# Patient Record
Sex: FEMALE | Race: WHITE | Employment: UNEMPLOYED | URBAN - METROPOLITAN AREA
[De-identification: names, ages, dates, MRNs, and addresses within clinical notes are randomized per-mention and may not be internally consistent; named-entity substitution may affect disease eponyms.]

---

## 2017-03-03 ENCOUNTER — TELEPHONE (OUTPATIENT)
Dept: FAMILY MEDICINE | Facility: CLINIC | Age: 32
End: 2017-03-03

## 2017-03-03 NOTE — TELEPHONE ENCOUNTER
Panel Management Review      Patient has the following on her problem list: None      Composite cancer screening  Chart review shows that this patient is due/due soon for the following Pap Smear  Summary:    Patient is due/failing the following:   PAP    Action needed:   Patient needs office visit for PE/PAP.    Type of outreach:    updated chart with recent pap, had normal  pap 05/11/2016 at [facility]    Questions for provider review:    None                                                                                                                                    Elif Bonilla/JOSÉ  [place]       Chart routed to none .

## 2017-07-08 ENCOUNTER — HOSPITAL ENCOUNTER (EMERGENCY)
Facility: CLINIC | Age: 32
Discharge: HOME | End: 2017-07-08
Attending: EMERGENCY MEDICINE | Admitting: EMERGENCY MEDICINE
Payer: COMMERCIAL

## 2017-07-08 ENCOUNTER — APPOINTMENT (OUTPATIENT)
Dept: ULTRASOUND IMAGING | Facility: CLINIC | Age: 32
End: 2017-07-08
Attending: PHYSICIAN ASSISTANT
Payer: COMMERCIAL

## 2017-07-08 ENCOUNTER — APPOINTMENT (OUTPATIENT)
Dept: CT IMAGING | Facility: CLINIC | Age: 32
End: 2017-07-08
Attending: PHYSICIAN ASSISTANT
Payer: COMMERCIAL

## 2017-07-08 ENCOUNTER — TRANSFERRED RECORDS (OUTPATIENT)
Dept: HEALTH INFORMATION MANAGEMENT | Facility: CLINIC | Age: 32
End: 2017-07-08

## 2017-07-08 VITALS
OXYGEN SATURATION: 100 % | SYSTOLIC BLOOD PRESSURE: 110 MMHG | RESPIRATION RATE: 18 BRPM | TEMPERATURE: 98.2 F | DIASTOLIC BLOOD PRESSURE: 60 MMHG | HEART RATE: 67 BPM

## 2017-07-08 DIAGNOSIS — R79.89 ELEVATED D-DIMER: ICD-10-CM

## 2017-07-08 DIAGNOSIS — M79.652 PAIN OF LEFT THIGH: ICD-10-CM

## 2017-07-08 LAB
INTERPRETATION ECG - MUSE: NORMAL
TROPONIN I SERPL-MCNC: NORMAL UG/L (ref 0–0.04)

## 2017-07-08 PROCEDURE — 71260 CT THORAX DX C+: CPT

## 2017-07-08 PROCEDURE — 25000128 H RX IP 250 OP 636: Performed by: EMERGENCY MEDICINE

## 2017-07-08 PROCEDURE — 84484 ASSAY OF TROPONIN QUANT: CPT | Performed by: PHYSICIAN ASSISTANT

## 2017-07-08 PROCEDURE — 93971 EXTREMITY STUDY: CPT | Mod: LT

## 2017-07-08 PROCEDURE — 99285 EMERGENCY DEPT VISIT HI MDM: CPT | Mod: 25

## 2017-07-08 RX ORDER — IOPAMIDOL 755 MG/ML
500 INJECTION, SOLUTION INTRAVASCULAR ONCE
Status: COMPLETED | OUTPATIENT
Start: 2017-07-08 | End: 2017-07-08

## 2017-07-08 RX ORDER — HYDROCODONE BITARTRATE AND ACETAMINOPHEN 5; 325 MG/1; MG/1
1-2 TABLET ORAL EVERY 4 HOURS PRN
Qty: 10 TABLET | Refills: 0 | Status: SHIPPED | OUTPATIENT
Start: 2017-07-08 | End: 2021-01-08

## 2017-07-08 RX ORDER — METHOCARBAMOL 750 MG/1
750 TABLET, FILM COATED ORAL 3 TIMES DAILY PRN
Qty: 30 TABLET | Refills: 0 | Status: SHIPPED | OUTPATIENT
Start: 2017-07-08 | End: 2017-07-13

## 2017-07-08 RX ADMIN — IOPAMIDOL 69 ML: 755 INJECTION, SOLUTION INTRAVENOUS at 18:03

## 2017-07-08 RX ADMIN — SODIUM CHLORIDE 83 ML: 9 INJECTION, SOLUTION INTRAVENOUS at 18:03

## 2017-07-08 ASSESSMENT — ENCOUNTER SYMPTOMS
FEVER: 0
SHORTNESS OF BREATH: 0
DYSURIA: 0
ABDOMINAL PAIN: 0
COUGH: 0

## 2017-07-08 NOTE — ED PROVIDER NOTES
History     Chief Complaint:  Leg swelling    HPI     History of present illness was obtained through an  service due to language barrier.  Brittany Clancy is a 32 year old female who presents with left leg swelling.  She first noticed one week ago around her left calf.  Later that day, she noticed pain in the left calf and it has slowly traveled up her left leg and now she is having pain in her left groin area.  She denies any injury to the area. She denies having any history of this pain.  She was at urgent care earlier today with a complaint of chest pressure.  They obtained labs (displayed below).  They're concerned given her elevated d-dimer and complaint of chest pain that this could represent a pulmonary embolism so she was sent here for further evaluation.  Here, she denies any current chest pain or shortness of breath. She denies fevers, cough, abdominal pain, or dysuria.     Lab work [facility](7/8/2017)  WBC:9.2, Hgb: 11.5, Creatinine: 0.6, K:3.9, D-dimer:0.86    CARDIAC RISK FACTORS:  Sex:    female  Tobacco:   negative  Hypertension:   negative  Hyperlipidemia:  negative  Diabetes:   negative  Family History:  negative    PE/DVT RISK FACTORS:  Sex:    female  Hormones:   negative  Tobacco:   negative  Cancer:   negative  Travel:   negative  Surgery:   negative  Other immobilization: negative  Personal history:  negative  Family history:  negative    Allergies:  NKDA     Medications:    The patient is currently on no regular medications.    Past Medical History:    Depression  Anxiety  Gastritis  Obesity    Past Surgical History:    C section    Family History:    Father: MI, Diabetes  Mother: Lipids, Gastric ulcer    Social History:  Negative for tobacco use.  Negative for alcohol use.  Marital Status:   [2]     Review of Systems   Constitutional: Negative for fever.   Respiratory: Negative for cough and shortness of breath.    Cardiovascular: Positive for  chest pain and leg swelling.   Gastrointestinal: Negative for abdominal pain.   Genitourinary: Negative for dysuria.   Skin: Negative for rash.   All other systems reviewed and are negative.    Physical Exam     Patient Vitals for the past 24 hrs:   BP Temp Temp src Pulse Resp SpO2   07/08/17 1745 99/64 - - - - -   07/08/17 1730 94/60 - - - - 96 %   07/08/17 1700 111/75 - - - - -   07/08/17 1645 113/56 - - - - 97 %   07/08/17 1632 116/75 98.2  F (36.8  C) Oral 67 18 100 %       Physical Exam  General: Resting comfortably.  Alert and oriented.   Head:  The scalp, face, and head appear normal   Eyes:  The pupils are equal, round, and reactive to light     Extraocular muscles are intact    Conjunctivae and sclerae are normal    CV:  Regular rate and rhythm     Normal S1/S2    No pathological murmur detected   Resp:  Lungs are clear to auscultation    Non-labored    No rales or wheezing   GI:  Abdomen is soft, non-distended    No rebound tenderness     Normal bowel sounds   MS:  Normal muscular tone. Mild swelling to left calf with no tenderness.Tenderness to palpation of groin. Pain with hip flexion.  Skin:  No rash or acute skin lesions noted   Neuro: Speech is normal and fluent.     Emergency Department Course   ECG:       EKG Interpretation:      Interpreted by Sherie Busby  Time reviewed:   Symptoms at time of EKG:  none  Rhythm: normal sinus   Rate: normal  Axis: NORMAL  Ectopy: none  Conduction: normal  ST Segments/ T Waves: No ST-T wave changes  Q Waves: none  Comparison to prior: No old EKG available    Clinical Impression: normal EKG    Imaging:  CT Chest Pulmonary Embolism w Contrast   Final Result   IMPRESSION: No acute pulmonary disease or pulmonary embolus. No   evidence for aortic dissection.      MUNIRA WELDON MD       Lower Extremity Venous Duplex Left   Final Result   IMPRESSION:    Negative for deep vein thrombus left leg.      MUNIRA WELDON MD        Laboratory:  Results for orders placed  or performed during the hospital encounter of 07/08/17   US Lower Extremity Venous Duplex Left    Narrative    LEFT LEG VENOUS ULTRASOUND 7/8/2017 5:27 PM    HISTORY: leg swelling and pain     TECHNIQUE; Venous Doppler ultrasound with color flow and spectral  Doppler with waveform analysis performed. Compression and augmentation  performed.    COMPARISON none     FINDINGS: There is no evidence for deep vein thrombus in the left  common femoral, superficial femoral, popliteal, or visualized portions  of posterior tibial veins.      Impression    IMPRESSION:   Negative for deep vein thrombus left leg.    MUNIRA WELDON MD   CT Chest Pulmonary Embolism w Contrast    Narrative    CT CHEST PULMONARY EMBOLISM WITH CONTRAST7/8/2017 6:39 PM    HISTORY: chest pain    TECHNIQUE: Axial images from thoracic inlet to diaphragm. 69mL  Isovue-370 IV contrast  Radiation dose for this scan was reduced using  automated exposure control, adjustment of the mA and/or kV according  to patient size, or iterative reconstruction technique.    COMPARISON: None.    FINDINGS:   CHEST: No acute pulmonary embolus. No thoracic aortic dissection or  adenopathy. Tiny calcified right upper lobe granuloma image 86 series  5. No acute infiltrate or pleural effusion, lungs otherwise clear. No  aggressive bone lesions.      Impression    IMPRESSION: No acute pulmonary disease or pulmonary embolus. No  evidence for aortic dissection.    MUNIRA WELDON MD   Troponin I   Result Value Ref Range    Troponin I ES  0.000 - 0.045 ug/L     <0.015  The 99th percentile for upper reference range is 0.045 ug/L.  Troponin values in   the range of 0.045 - 0.120 ug/L may be associated with risks of adverse   clinical events.       Interventions:  Medications   0.9% sodium chloride BOLUS (0 mLs Intravenous Stopped 7/8/17 1804)   iopamidol (ISOVUE-370) solution 500 mL (69 mLs Intravenous Given 7/8/17 1803)     Emergency Department Course:  Nursing notes and vitals  reviewed. I performed an exam of the patient as documented above. Dr. Parker also saw this patient.     The above workup was undertaken.    Impression & Plan    Medical Decision Making:  Brittany Clancy is a 32 year old female who presents with left leg swelling. She was in urgent care earlier today and had an elevated D-dimer and was sent here for further examination. The patient presented vitally stable and afebrile. The patient arrived with no chest pain or shortness of breath, so CT scan was not obtained immediately, rather an ultrasound of her left leg was taken. US did not demonstrate a clot. Given her elevated D-dimer at urgent care a CT scan was obtained and did not reveal PE or other abnormality. EKG and troponin were obtained given her chest pressure episodes earlier and her one episode here and were normal. I do not think there is a serious cause of her symptoms at this time given the negative workup here and intermittent nature. HEART Score is 0. I think her leg symptoms are likely due to a hip flexor or groin muscle strain. She will be discharged home with a prescription for robaxin and norco. A physical therapy referral was also made. She was instructed to take scheduled ibuprofen for the next several days and Norco for breakthrough pain.  She was warned on the sedating effects of Robaxin.  She was asked to follow up in clinic in 2-3 days for recheck.  She was asked to return to the ED if she develops new or worsening chest pain, shortness of breath, fever, or any other concerning symptoms.  All questions were answered prior to discharge. The patient understands and agrees plan.     Diagnosis:    ICD-10-CM    1. Pain of left thigh M79.652 PHYSICAL THERAPY REFERRAL   2. Elevated d-dimer R79.89      Disposition:  discharged to home    Discharge Medications:  Discharge Medication List as of 7/8/2017  7:26 PM      START taking these medications    Details   methocarbamol (ROBAXIN) 750 MG tablet Take  1 tablet (750 mg) by mouth 3 times daily as needed for muscle spasms, Disp-30 tablet, R-0, Local Print      HYDROcodone-acetaminophen (NORCO) 5-325 MG per tablet Take 1-2 tablets by mouth every 4 hours as needed for pain, Disp-10 tablet, R-0, Local Print              Sherie Busby PA-C  07/08/17 7091

## 2017-07-08 NOTE — ED AVS SNAPSHOT
[facility] Emergency Department    [address]    [facility] [address]    Phone:  [phone]    Fax:  [phone]                                       Brittany Clancy   MRN: [id number]    Department:  [facility] Emergency Department   Date of Visit:  7/8/2017           After Visit Summary Signature Page     I have received my discharge instructions, and my questions have been answered. I have discussed any challenges I see with this plan with the nurse or doctor.    ..........................................................................................................................................  Patient/Patient Representative Signature      ..........................................................................................................................................  Patient Representative Print Name and Relationship to Patient    ..................................................               ................................................  Date                                            Time    ..........................................................................................................................................  Reviewed by Signature/Title    ...................................................              ..............................................  Date                                                            Time

## 2017-07-08 NOTE — ED NOTES
Patient returned from CT in tears due to chest pain. Patient reports it came on suddenly, she couldn't catch her breath and it was as if someone punched her in the chest. It only lasted a few minutes and is gone at this time. EKG obtained and patient is in NSR.

## 2017-07-08 NOTE — ED AVS SNAPSHOT
[facility] Emergency Department    [address]    [facility] [address]    Phone:  [phone]    Fax:  [phone]                                       Brittany Clancy   MRN: [id number]    Department:  [facility] Emergency Department   Date of Visit:  7/8/2017           Patient Information     Date Of Birth          1985        Your diagnoses for this visit were:     Pain of left thigh     Elevated d-dimer        You were seen by Chitra Parker MD.      Follow-up Information     Follow up with No Ref-Primary, Physician In 3 days.    Why:  recheck in clinic        Follow up with [facility] Emergency [facility].    Specialty:  EMERGENCY MEDICINE    Why:  If symptoms worsen    Contact information:    [address]  [facility] [id number] [phone]        Discharge Instructions         You were evaluated for leg swelling. We did not find a clot in your legs or lungs. I think your symptoms may be due to a pulled muscle in your thigh. Please take ibuprofen 600mg every 6-8 hours. Take Norco for breakthrough pain. Take robaxin as needed for muscle spasm. Please follow up with your [facility] in a few days for a recheck. A referral to physical therapy was made for you.  Please return to the ED if you develop new/worsening chest pain, shortness of breath, fever or other concerning symptoms.   Distensión Muscular,Extremidad [Muscle Strain, Extremity]  JACQUELIN DISTENSIÓN MUSCULAR consiste en un estiramiento y desgarro de las fibras de un músculo. [place] produce dolor, especialmente al  el músculo. También puede breann algo de hinchazón y moretones.  Cuidados En [place]:  1) Mantenga elevada la cheikh lesionada para reducir el dolor y la hinchazón. [place] es especialmente importante lele las primeras 48 horas.  2) Aplíquese un paquete de hielo (hielo molido o en cubitos, en jacquelin bolsa de plástico envuelta en jacquelin toalla) lele  20 minutos cada 2 a 4 horas el primer día. Debería seguir con paquetes de hielo 3 a 4 veces al día el luiz y tercer día. A menos que le instruyan otra cosa, el cuarto día, puede empezar a remojar o aplicar compresas (toallas mojadas en [place]) sobre la cheikh afectada 3-4 veces al día mientras ejercita suavemente dicha cheikh.  3) Puede usar acetaminofén (Tylenol) o ibuprofeno (Motrin o Advil) para controlar el dolor, a menos que le hayan recetado otro medicamento. [ NOTA : Si tiene jacquelin enfermedad hepática o renal crónica, o ha tenido alguna vez jacquelin úlcera estomacal o sangrado gastrointestinal, consulte con fischer médico antes de ashlie estos medicamentos.]  4) Para las DISTENSIONES EN JACQUELIN PIERNA: Si le recomendaron el uso de MULETAS, no apoye todo fischer peso en la pierna lesionada hasta que pueda hacerlo sin sentir dolor. Podrá volver a hacer deportes cuando sea capaz de saltar y correr con la pierna lesionada sin sentir dolor.  Programe jacquelin VISITA DE CONTROL con fischer médico o con sherrie centro si no empieza a mejorar en los próximos anup días.  Busque Prontamente Atención Médica  si algo de lo siguiente ocurre:  -- Hinchazón, frío, coloración azulada, entumecimiento u hormigueo en los dedos de las papa o de los pies  -- Aumento del dolor o aumento de la hinchazón  Date Last Reviewed: 11/19/2015 2000-2017 [employer]. [address] [place] [address], [place] [address]. Todos los derechos reservados. Esta información no pretende sustituir la atención médica profesional. Sólo fischer médico puede diagnosticar y tratar un problema de annia.          24 Hour Appointment Hotline       To make an appointment at any [facility] clinic, call [phone] ([phone]). If you don't have a family doctor or clinic, we will help you find one. [facility] clinics are conveniently located to serve the needs of you and your family.          ED Discharge Orders     PHYSICAL THERAPY REFERRAL                    Review of your  medicines      START taking        Dose / Directions Last dose taken    HYDROcodone-acetaminophen 5-325 MG per tablet   Commonly known as:  NORCO   Dose:  1-2 tablet   Quantity:  10 tablet        Take 1-2 tablets by mouth every 4 hours as needed for pain   Refills:  0        methocarbamol 750 MG tablet   Commonly known as:  ROBAXIN   Dose:  750 mg   Quantity:  30 tablet        Take 1 tablet (750 mg) by mouth 3 times daily as needed for muscle spasms   Refills:  0          Our records show that you are taking the medicines listed below. If these are incorrect, please call your family doctor or clinic.        Dose / Directions Last dose taken    prenatal multivitamin  plus iron 27-0.8 MG Tabs per tablet   Dose:  1 tablet        Take 1 tablet by mouth daily   Refills:  0                Prescriptions were sent or printed at these locations (2 Prescriptions)                   Other Prescriptions                Printed at Department/Unit printer (2 of 2)         methocarbamol (ROBAXIN) 750 MG tablet               HYDROcodone-acetaminophen (NORCO) 5-325 MG per tablet                Procedures and tests performed during your visit     CT Chest Pulmonary Embolism w Contrast    EKG 12 lead    Troponin I    US Lower Extremity Venous Duplex Left      Orders Needing Specimen Collection     None      Pending Results     No orders found from 7/6/2017 to 7/9/2017.            Pending Culture Results     No orders found from 7/6/2017 to 7/9/2017.            Pending Results Instructions     If you had any lab results that were not finalized at the time of your Discharge, you can call the ED Lab Result RN at [phone]. You will be contacted by this team for any positive Lab results or changes in treatment. The nurses are available 7 days a week from 10A to 6:30P.  You can leave a message 24 hours per day and they will return your call.        Test Results From Your Hospital Stay        7/8/2017  5:32 PM      Narrative       LEFT LEG  VENOUS ULTRASOUND 7/8/2017 5:27 PM    HISTORY: leg swelling and pain     TECHNIQUE; Venous Doppler ultrasound with color flow and spectral  Doppler with waveform analysis performed. Compression and augmentation  performed.    COMPARISON none     FINDINGS: There is no evidence for deep vein thrombus in the left  common femoral, superficial femoral, popliteal, or visualized portions  of posterior tibial veins.        Impression     IMPRESSION:   Negative for deep vein thrombus left leg.    MUNIRA WELDON MD         7/8/2017  6:49 PM      Narrative     CT CHEST PULMONARY EMBOLISM WITH CONTRAST7/8/2017 6:39 PM    HISTORY: chest pain    TECHNIQUE: Axial images from thoracic inlet to diaphragm. 69mL  Isovue-370 IV contrast  Radiation dose for this scan was reduced using  automated exposure control, adjustment of the mA and/or kV according  to patient size, or iterative reconstruction technique.    COMPARISON: None.    FINDINGS:   CHEST: No acute pulmonary embolus. No thoracic aortic dissection or  adenopathy. Tiny calcified right upper lobe granuloma image 86 series  5. No acute infiltrate or pleural effusion, lungs otherwise clear. No  aggressive bone lesions.        Impression     IMPRESSION: No acute pulmonary disease or pulmonary embolus. No  evidence for aortic dissection.    MUNIRA WELDON MD         7/8/2017  6:19 PM      Component Results     Component Value Ref Range & Units Status    Troponin I ES  0.000 - 0.045 ug/L Final    <0.015  The 99th percentile for upper reference range is 0.045 ug/L.  Troponin values in   the range of 0.045 - 0.120 ug/L may be associated with risks of adverse   clinical events.                  Clinical Quality Measure: Blood Pressure Screening     Your blood pressure was checked while you were in the emergency department today. The last reading we obtained was  BP: 110/60 . Please read the guidelines below about what these numbers mean and what you should do about them.  If your  "systolic blood pressure (the top number) is less than 120 and your diastolic blood pressure (the bottom number) is less than 80, then your blood pressure is normal. There is nothing more that you need to do about it.  If your systolic blood pressure (the top number) is 120-139 or your diastolic blood pressure (the bottom number) is 80-89, your blood pressure may be higher than it should be. You should have your blood pressure rechecked within a year by a primary care provider.  If your systolic blood pressure (the top number) is 140 or greater or your diastolic blood pressure (the bottom number) is 90 or greater, you may have high blood pressure. High blood pressure is treatable, but if left untreated over time it can put you at risk for heart attack, stroke, or kidney failure. You should have your blood pressure rechecked by a primary care provider within the next 4 weeks.  If your provider in the emergency department today gave you specific instructions to follow-up with your doctor or provider even sooner than that, you should follow that instruction and not wait for up to 4 weeks for your follow-up visit.        Thank you for choosing [facility]       Thank you for choosing [facility] for your care. Our goal is always to provide you with excellent care. Hearing back from our patients is one way we can continue to improve our services. Please take a few minutes to complete the written survey that you may receive in the mail after you visit with us. Thank you!        [facility]ator Information     [facility] lets you send messages to your doctor, view your test results, renew your prescriptions, schedule appointments and more. To sign up, go to [email or web address] . Click on \"Log in\" on the left side of the screen, which will take you to the Welcome page. Then click on \"Sign up Now\" on the right side of the page.     You will be asked to enter the access code listed below, as well as some personal information. Please " follow the directions to create your username and password.     Your access code is: [email or web address]  Expires: 10/6/2017  7:26 PM     Your access code will  in 90 days. If you need help or a new code, please call your [facility] clinic or [phone].        Care EveryWhere ID     This is your Care EveryWhere ID. This could be used by other organizations to access your [facility] medical records  [id number]        Equal Access to Services     [facility] : Hadii aad ku hadasho Soomaali, waaxda luqadaha, qaybta kaalmada adeegyajamir, kt rao. So [facility] [phone].    ATENCIÓN: Si habla español, tiene a fischer disposición servicios gratuitos de asistencia lingüística. Llame al [phone].    We comply with applicable federal civil rights laws and [place] laws. We do not discriminate on the basis of race, color, national origin, age, disability sex, sexual orientation or gender identity.            After Visit Summary       This is your record. Keep this with you and show to your community pharmacist(s) and doctor(s) at your next visit.

## 2017-07-09 NOTE — DISCHARGE INSTRUCTIONS
You were evaluated for leg swelling. We did not find a clot in your legs or lungs. I think your symptoms may be due to a pulled muscle in your thigh. Please take ibuprofen 600mg every 6-8 hours. Take Norco for breakthrough pain. Take robaxin as needed for muscle spasm. Wear a compression stocking as needed. Please follow up with your [facility] in a few days for a recheck. A referral to physical therapy was made for you.  Please return to the ED if you develop new/worsening chest pain, shortness of breath, fever or other concerning symptoms.   Distensión Muscular,Extremidad [Muscle Strain, Extremity]  JACQUELIN DISTENSIÓN MUSCULAR consiste en un estiramiento y desgarro de las fibras de un músculo. [place] produce dolor, especialmente al  el músculo. También puede breann algo de hinchazón y moretones.  Cuidados En [place]:  1) Mantenga elevada la cheikh lesionada para reducir el dolor y la hinchazón. [place] es especialmente importante lele las primeras 48 horas.  2) Aplíquese un paquete de hielo (hielo molido o en cubitos, en jacquelin bolsa de plástico envuelta en jacquelin toalla) lele 20 minutos cada 2 a 4 horas el primer día. Debería seguir con paquetes de hielo 3 a 4 veces al día el luiz y tercer día. A menos que le instruyan otra cosa, el cuarto día, puede empezar a remojar o aplicar compresas (toallas mojadas en [place]) sobre la cheikh afectada 3-4 veces al día mientras ejercita suavemente dicha cheikh.  3) Puede usar acetaminofén (Tylenol) o ibuprofeno (Motrin o Advil) para controlar el dolor, a menos que le hayan recetado otro medicamento. [ NOTA : Si tiene jacquelin enfermedad hepática o renal crónica, o ha tenido alguna vez jacquelin úlcera estomacal o sangrado gastrointestinal, consulte con fischer médico antes de ashlie estos medicamentos.]  4) Para las DISTENSIONES EN JACQUELIN PIERNA: Si le recomendaron el uso de MULETAS, no apoye todo fischer peso en la pierna lesionada hasta que pueda hacerlo sin sentir dolor. Podrá volver a  hacer deportes cuando sea capaz de saltar y correr con la pierna lesionada sin sentir dolor.  Programe jacquelin VISITA DE CONTROL con fischer médico o con sherrie centro si no empieza a mejorar en los próximos anup días.  Busque Prontamente Atención Médica  si algo de lo siguiente ocurre:  -- Hinchazón, frío, coloración azulada, entumecimiento u hormigueo en los dedos de las papa o de los pies  -- Aumento del dolor o aumento de la hinchazón  Date Last Reviewed: 11/19/2015 2000-2017 [employer]. [address] [place] [id number]. Todos los derechos reservados. Esta información no pretende sustituir la atención médica profesional. Sólo fischer médico puede diagnosticar y tratar un problema de annia.

## 2020-12-22 ENCOUNTER — APPOINTMENT (OUTPATIENT)
Dept: INTERPRETER SERVICES | Facility: CLINIC | Age: 35
End: 2020-12-22
Payer: COMMERCIAL

## 2020-12-22 ENCOUNTER — TELEPHONE (OUTPATIENT)
Dept: INTERNAL MEDICINE | Facility: CLINIC | Age: 35
End: 2020-12-22

## 2020-12-22 NOTE — TELEPHONE ENCOUNTER
Reason for Call:  Other call back/prescription    Detailed comments: Pt has a burning red bump on her back. She scheduled an appointment for next week Monday, 12/28/20 10:20am. She would like to know what she should do between now and her scheduled appt time. Pt does use the [facility] on [address]    Phone Number Patient can be reached at: Cell number on file:    Telephone Information:   Mobile [phone]       Best Time: Anytime    Can we leave a detailed message on this number? YES    Call taken on 12/22/2020 at 9:57 AM by Flora Hui

## 2020-12-26 ENCOUNTER — HOSPITAL ENCOUNTER (EMERGENCY)
Facility: CLINIC | Age: 35
Discharge: HOME | End: 2020-12-27
Attending: EMERGENCY MEDICINE | Admitting: EMERGENCY MEDICINE
Payer: COMMERCIAL

## 2020-12-26 ENCOUNTER — APPOINTMENT (OUTPATIENT)
Dept: CT IMAGING | Facility: CLINIC | Age: 35
End: 2020-12-26
Attending: EMERGENCY MEDICINE
Payer: COMMERCIAL

## 2020-12-26 DIAGNOSIS — M54.9 ACUTE BACK PAIN, UNSPECIFIED BACK LOCATION, UNSPECIFIED BACK PAIN LATERALITY: ICD-10-CM

## 2020-12-26 DIAGNOSIS — L08.9 INFECTED SEBACEOUS CYST: ICD-10-CM

## 2020-12-26 DIAGNOSIS — L72.3 INFECTED SEBACEOUS CYST: ICD-10-CM

## 2020-12-26 DIAGNOSIS — Z20.822 SUSPECTED COVID-19 VIRUS INFECTION: ICD-10-CM

## 2020-12-26 DIAGNOSIS — R07.89 ATYPICAL CHEST PAIN: ICD-10-CM

## 2020-12-26 LAB
ANION GAP SERPL CALCULATED.3IONS-SCNC: 1 MMOL/L (ref 3–14)
BASOPHILS # BLD AUTO: 0 10E9/L (ref 0–0.2)
BASOPHILS NFR BLD AUTO: 0.4 %
BUN SERPL-MCNC: 15 MG/DL (ref 7–30)
CALCIUM SERPL-MCNC: 9 MG/DL (ref 8.5–10.1)
CHLORIDE SERPL-SCNC: 108 MMOL/L (ref 94–109)
CO2 SERPL-SCNC: 28 MMOL/L (ref 20–32)
CREAT SERPL-MCNC: 0.73 MG/DL (ref 0.52–1.04)
D DIMER PPP FEU-MCNC: 0.6 UG/ML FEU (ref 0–0.5)
DIFFERENTIAL METHOD BLD: NORMAL
EOSINOPHIL # BLD AUTO: 0.2 10E9/L (ref 0–0.7)
EOSINOPHIL NFR BLD AUTO: 2.1 %
ERYTHROCYTE [DISTWIDTH] IN BLOOD BY AUTOMATED COUNT: 12.1 % (ref 10–15)
GFR SERPL CREATININE-BSD FRML MDRD: >90 ML/MIN/{1.73_M2}
GLUCOSE SERPL-MCNC: 97 MG/DL (ref 70–99)
HCG SERPL QL: NEGATIVE
HCT VFR BLD AUTO: 38 % (ref 35–47)
HGB BLD-MCNC: 12.3 G/DL (ref 11.7–15.7)
IMM GRANULOCYTES # BLD: 0.1 10E9/L (ref 0–0.4)
IMM GRANULOCYTES NFR BLD: 0.6 %
LYMPHOCYTES # BLD AUTO: 1.9 10E9/L (ref 0.8–5.3)
LYMPHOCYTES NFR BLD AUTO: 21.4 %
MCH RBC QN AUTO: 30.8 PG (ref 26.5–33)
MCHC RBC AUTO-ENTMCNC: 32.4 G/DL (ref 31.5–36.5)
MCV RBC AUTO: 95 FL (ref 78–100)
MONOCYTES # BLD AUTO: 0.5 10E9/L (ref 0–1.3)
MONOCYTES NFR BLD AUTO: 6 %
NEUTROPHILS # BLD AUTO: 6.3 10E9/L (ref 1.6–8.3)
NEUTROPHILS NFR BLD AUTO: 69.5 %
NRBC # BLD AUTO: 0 10*3/UL
NRBC BLD AUTO-RTO: 0 /100
PLATELET # BLD AUTO: 322 10E9/L (ref 150–450)
POTASSIUM SERPL-SCNC: 3.7 MMOL/L (ref 3.4–5.3)
RBC # BLD AUTO: 4 10E12/L (ref 3.8–5.2)
SODIUM SERPL-SCNC: 137 MMOL/L (ref 133–144)
TROPONIN I SERPL-MCNC: <0.015 UG/L (ref 0–0.04)
WBC # BLD AUTO: 9 10E9/L (ref 4–11)

## 2020-12-26 PROCEDURE — 71275 CT ANGIOGRAPHY CHEST: CPT

## 2020-12-26 PROCEDURE — 85025 COMPLETE CBC W/AUTO DIFF WBC: CPT | Performed by: EMERGENCY MEDICINE

## 2020-12-26 PROCEDURE — 80048 BASIC METABOLIC PNL TOTAL CA: CPT | Performed by: EMERGENCY MEDICINE

## 2020-12-26 PROCEDURE — 10060 I&D ABSCESS SIMPLE/SINGLE: CPT

## 2020-12-26 PROCEDURE — 85379 FIBRIN DEGRADATION QUANT: CPT | Performed by: EMERGENCY MEDICINE

## 2020-12-26 PROCEDURE — 99285 EMERGENCY DEPT VISIT HI MDM: CPT | Mod: 25

## 2020-12-26 PROCEDURE — 76604 US EXAM CHEST: CPT

## 2020-12-26 PROCEDURE — 96374 THER/PROPH/DIAG INJ IV PUSH: CPT

## 2020-12-26 PROCEDURE — 93005 ELECTROCARDIOGRAM TRACING: CPT

## 2020-12-26 PROCEDURE — 96361 HYDRATE IV INFUSION ADD-ON: CPT

## 2020-12-26 PROCEDURE — 250N000011 HC RX IP 250 OP 636: Performed by: EMERGENCY MEDICINE

## 2020-12-26 PROCEDURE — 258N000003 HC RX IP 258 OP 636: Performed by: EMERGENCY MEDICINE

## 2020-12-26 PROCEDURE — 84703 CHORIONIC GONADOTROPIN ASSAY: CPT | Performed by: EMERGENCY MEDICINE

## 2020-12-26 PROCEDURE — 84484 ASSAY OF TROPONIN QUANT: CPT | Performed by: EMERGENCY MEDICINE

## 2020-12-26 RX ORDER — FENTANYL CITRATE 50 UG/ML
100 INJECTION, SOLUTION INTRAMUSCULAR; INTRAVENOUS ONCE
Status: COMPLETED | OUTPATIENT
Start: 2020-12-26 | End: 2020-12-27

## 2020-12-26 RX ORDER — KETOROLAC TROMETHAMINE 15 MG/ML
10 INJECTION, SOLUTION INTRAMUSCULAR; INTRAVENOUS ONCE
Status: COMPLETED | OUTPATIENT
Start: 2020-12-26 | End: 2020-12-26

## 2020-12-26 RX ADMIN — SODIUM CHLORIDE 1000 ML: 9 INJECTION, SOLUTION INTRAVENOUS at 23:47

## 2020-12-26 RX ADMIN — KETOROLAC TROMETHAMINE 10 MG: 15 INJECTION, SOLUTION INTRAMUSCULAR; INTRAVENOUS at 23:47

## 2020-12-26 ASSESSMENT — ENCOUNTER SYMPTOMS
BACK PAIN: 1
VOMITING: 0
FEVER: 0
DIARRHEA: 0
HEADACHES: 1
COUGH: 1
SHORTNESS OF BREATH: 0
APPETITE CHANGE: 1
ABDOMINAL PAIN: 0
FATIGUE: 1

## 2020-12-26 ASSESSMENT — MIFFLIN-ST. JEOR: SCORE: 1491.48

## 2020-12-26 NOTE — ED AVS SNAPSHOT
[facility]  [address]  [facility] [address]  Phone: [phone]  Fax: [phone]                                    Brittany Clancy   MRN: [id number]    Department: [facility]   Date of Visit: 12/26/2020           After Visit Summary Signature Page    I have received my discharge instructions, and my questions have been answered. I have discussed any challenges I see with this plan with the nurse or doctor.    ..........................................................................................................................................  Patient/Patient Representative Signature      ..........................................................................................................................................  Patient Representative Print Name and Relationship to Patient    ..................................................               ................................................  Date                                   Time    ..........................................................................................................................................  Reviewed by Signature/Title    ...................................................              ..............................................  Date                                               Time          22EPIC Rev 08/18

## 2020-12-27 VITALS
WEIGHT: 187 LBS | SYSTOLIC BLOOD PRESSURE: 113 MMHG | HEIGHT: 62 IN | DIASTOLIC BLOOD PRESSURE: 65 MMHG | TEMPERATURE: 99.8 F | HEART RATE: 79 BPM | OXYGEN SATURATION: 100 % | RESPIRATION RATE: 18 BRPM | BODY MASS INDEX: 34.41 KG/M2

## 2020-12-27 LAB — INTERPRETATION ECG - MUSE: NORMAL

## 2020-12-27 PROCEDURE — 71275 CT ANGIOGRAPHY CHEST: CPT

## 2020-12-27 PROCEDURE — 250N000011 HC RX IP 250 OP 636: Performed by: EMERGENCY MEDICINE

## 2020-12-27 PROCEDURE — 96375 TX/PRO/DX INJ NEW DRUG ADDON: CPT

## 2020-12-27 PROCEDURE — 250N000009 HC RX 250: Performed by: EMERGENCY MEDICINE

## 2020-12-27 RX ORDER — CYCLOBENZAPRINE HCL 10 MG
10 TABLET ORAL 3 TIMES DAILY PRN
Qty: 20 TABLET | Refills: 0 | Status: SHIPPED | OUTPATIENT
Start: 2020-12-27 | End: 2021-01-08

## 2020-12-27 RX ORDER — IOPAMIDOL 755 MG/ML
500 INJECTION, SOLUTION INTRAVASCULAR ONCE
Status: COMPLETED | OUTPATIENT
Start: 2020-12-27 | End: 2020-12-27

## 2020-12-27 RX ORDER — LIDOCAINE HYDROCHLORIDE AND EPINEPHRINE 10; 10 MG/ML; UG/ML
INJECTION, SOLUTION INFILTRATION; PERINEURAL
Status: DISCONTINUED
Start: 2020-12-27 | End: 2020-12-27 | Stop reason: HOSPADM

## 2020-12-27 RX ADMIN — IOPAMIDOL 64 ML: 755 INJECTION, SOLUTION INTRAVENOUS at 00:06

## 2020-12-27 RX ADMIN — FENTANYL CITRATE 100 MCG: 50 INJECTION, SOLUTION INTRAMUSCULAR; INTRAVENOUS at 00:51

## 2020-12-27 RX ADMIN — SODIUM CHLORIDE 84 ML: 9 INJECTION, SOLUTION INTRAVENOUS at 00:06

## 2020-12-27 NOTE — DISCHARGE INSTRUCTIONS
Discharge Instructions  Chest Pain    You have been seen today for chest pain or discomfort.  At this time, your provider has found no signs that your chest pain is due to a serious or life-threatening condition, (or you have declined more testing and/or admission to the hospital). However, sometimes there is a serious problem that does not show up right away. Your evaluation today may not be complete and you may need further testing and evaluation.     Generally, every Emergency Department visit should have a follow-up clinic visit with either a primary or a specialty clinic/provider. Please follow-up as instructed by your emergency provider today.  Return to the Emergency Department if:  Your chest pain changes, gets worse, starts to happen more often, or comes with less activity.  You are newly short of breath.  You get very weak or tired.  You pass out or faint.  You have any new symptoms, like fever, cough, numb legs, or you cough up blood.  You have anything else that worries you.    Until you follow-up with your regular provider, please do the following:  Take one aspirin daily unless you have an allergy or are told not to by your provider.  If a stress test appointment has been made, go to the appointment.  If you have questions, contact your regular provider.  Follow-up with your regular provider/clinic as directed; this is very important.    If you were given a prescription for medicine here today, be sure to read all of the information (including the package insert) that comes with your prescription.  This will include important information about the medicine, its side effects, and any warnings that you need to know about.  The pharmacist who fills the prescription can provide more information and answer questions you may have about the medicine.  If you have questions or concerns that the pharmacist cannot address, please call or return to the Emergency Department.       Remember that you can always come  back to the Emergency Department if you are not able to see your regular provider in the amount of time listed above, if you get any new symptoms, or if there is anything that worries you.  Discharge Instructions  Back Pain  You were seen today for back pain. Back pain can have many causes, but most will get better without surgery or other specific treatment. Sometimes there is a herniated ( slipped ) disc. We do not usually do MRI scans to look for these right away, since most herniated discs will get better on their own with time.  Today, we did not find any evidence that your back pain was caused by a serious condition. However, sometimes symptoms develop over time and cannot be found during an emergency visit, so it is very important that you follow up with your primary provider.  Generally, every Emergency Department visit should have a follow-up clinic visit with either a primary or a specialty clinic/provider. Please follow-up as instructed by your emergency provider today.    Return to the Emergency Department if:  You develop a fever with your back pain.   You have weakness or change in sensation in one or both legs.  You lose control of your bowels or bladder, or cannot empty your bladder (cannot pee).  Your pain gets much worse.     Follow-up with your provider:  Unless your pain has completely gone away, please make an appointment with your provider within one week. Most of the routine care for back pain is available in a clinic and not the Emergency Department. You may need further management of your back pain, such as more pain medication, imaging such as an X-ray or MRI, or physical therapy.    What can I do to help myself?  Remain Active -- People are often afraid that they will hurt their back further or delay recovery by remaining active, but this is one of the best things you can do for your back. In fact, staying in bed for a long time to rest is not recommended. Studies have shown that people  with low back pain recover faster when they remain active. Movement helps to bring blood flow to the muscles and relieve muscle spasms as well as preventing loss of muscle strength.  Heat -- Using a heating pad can help with low back pain during the first few weeks. Do not sleep with a heating pad, as you can be burned.   Pain medications - You may take a pain medication such as Tylenol  (acetaminophen), Advil , Motrin  (ibuprofen) or Aleve  (naproxen).  If you were given a prescription for medicine here today, be sure to read all of the information (including the package insert) that comes with your prescription.  This will include important information about the medicine, its side effects, and any warnings that you need to know about.  The pharmacist who fills the prescription can provide more information and answer questions you may have about the medicine.  If you have questions or concerns that the pharmacist cannot address, please call or return to the Emergency Department.   Remember that you can always come back to the Emergency Department if you are not able to see your regular provider in the amount of time listed above, if you get any new symptoms, or if there is anything that worries you.  Discharge Instructions  Boils or Abscesses, MRSA Skin infections    You have been treated today for a skin boil or abscess. A boil is an infection under the skin that causes a painful pus filled lump. Boils often start when bacteria infect a hair follicle, the place where a hair starts to grow.  The most common places that boils develop are on the face, neck, armpits, breasts, groin and buttocks. When they are small they can often be treated at home, but they can grow quickly, become very painful, and require medical attention.    Many of these infections are staph (pronounced  staff ) infections. Staph is a type of bacteria that commonly lives on skin. As many as 1 out of 3 people have staph that lives on their skin.  Usually, it causes no problems, but if there is a cut or scrape, it can cause an infection. You have been treated today for an infection thought to be caused by MRSA, (pronounced  mursa ) which stands for methicillin resistant staph aureus. MRSA can be very difficult to treat. The antibiotics that were once used for skin infections do not work on MRSA, so alternative medications may be prescribed.    Generally, every Emergency Department visit should have a follow-up clinic visit with either a primary or a specialty clinic/provider. Please follow-up as instructed by your emergency provider today.    Return to the Emergency Department if:  Your redness, pain, or swelling gets a lot worse.  You are unable to get or take the prescribed medications.  You are feeling more ill, weak or lightheaded.  You start to run a new fever (temperature >101 F).  Anything else about the infection worries or concerns you.  Treatment:  Incision and drainage (opening the boil with a scalpel to help the pus drain) or needle aspiration (removing pus with a syringe) is sometimes needed for larger abscesses. For many abscess, this alone is the treatment. A wick or packing is sometimes put in the wound to encourage ongoing drainage of infection from the area.  Please leave it in place for as long as instructed by your care provider or until your follow up wound check in 48 hours.  An antibiotic might be prescribed. If so, start taking it right away, and take them as prescribed. Be sure to finish the whole prescription, even if you are better.  Apply a heating pad, warm packs, or warm water soaks to the infected area for 15 minutes at a time, at least 3 times a day. Do not use a heating pad on your feet or legs if you have diabetes. Do not sleep with a heating pad on, since this can cause burns or skin injury.  Raise the affected area above the level of your heart as much as possible in the first 1-2 days.  Pain medication - You may take an  over-the-counter pain medication such as Tylenol  (acetaminophen), Advil  (ibuprofen), Motrin  (ibuprofen) or Aleve  (naproxen).    Information about MRSA    How do you catch MRSA?  By touching a person who has MRSA on his or her skin.  By being nearby when a person with MRSA breathes, coughs, or sneezes.  By touching a table, handle, or other surface that has the germ on it.  If the germ is on your skin and you cut yourself or have another injury, you can get infected.    How do I know if I have a MRSA infection? MRSA most commonly causes skin infections such as boils, red tender lumps that contain pus. Your physician may recognize MRSA from the appearance of your infection. Sometimes, your doctor may swab your skin or the drainage from a boil to test for MRSA or other bacteria.    Can MRSA be treated? Yes, certain medications are still effective in treating MRSA infections.  It is very important that you follow the directions exactly. Take ALL the pills you are given, even if you feel better before you finish the pills. If you do not take them all, the germ could come back even stronger and be harder to treat next time.  Is there any way to prevent MRSA?   Wash your hands frequently with soap and water.  Do not share towels, washcloths, razors or other personal care items.  Wipe down gym equipment before and after you use it.    If you develop a similar infection in the future, you can try to treat it at home:  Apply warm compresses to the affected area to promote drainage.  Wash hands frequently to prevent spread of infection.  Keep affected area covered to prevent spread of infection.  Never squeeze or pop boils.     When to seek medical attention for boils:  You develop a fever.  The area around the boil becomes red or red streaks develop.  Your pain becomes severe.  You develop swollen lymph nodes.  You have diabetes, a heart murmur, an immune disease like HIV or AIDS, you take corticosteroids for a medical  condition, or you are on chemotherapy.  You develop a boil or abscess on your face, near your spine or near your rectal opening.  If you were given a prescription for medicine here today, be sure to read all of the information (including the package insert) that comes with your prescription.  This will include important information about the medicine, its side effects, and any warnings that you need to know about.  The pharmacist who fills the prescription can provide more information and answer questions you may have about the medicine.  If you have questions or concerns that the pharmacist cannot address, please call or return to the Emergency Department.     Remember that you can always come back to the Emergency Department if you are not able to see your regular doctor in the amount of time listed above, if you get any new symptoms, or if there is anything that worries you.  Discharge Instructions  COVID-19    COVID-19 is the disease caused by a new coronavirus. The virus spreads from person-to-person primarily by droplets when an infected person coughs or sneezes and the droplet either lands on another person or that other person touches a surface with the droplet on it. There are tests available to diagnose COVID-19. There is no specific treatment or medicine for the disease.    You may have been diagnosed with COVID, may be being tested for COVID and have a pending test result, or may have been exposed to COVID.    Symptoms of COVID-19    Many people have no symptoms or mild symptoms.  Symptoms may usually appear 4 to 5 days (up to 14 days) after contact with a person with COVID-19. Some people will get severe symptoms and pneumonia. Usual symptoms are:     ? Fever  ? Cough  ? Trouble breathing    Less common symptoms are: Headache, body aches, sore throat, sneezing, diarrhea, loss of taste or smell.    Isolation and Quarantine    You were seen because you have symptoms, had an exposure, or had some other  concern about possible COVID. The best way to stop the spread of the virus is to avoid contact with others.  Isolation refers to sick people staying away from people who are not sick. A person in quarantine is limiting activity because they were exposed and are waiting to see if they might become sick.    If you test positive for COVID, you should stay home (isolation) for at least 10 days after your symptoms began, and for 24 hours with no fever and improvement of symptoms--whichever is longer. (Your fever should be gone for 24 hours without using fever-reducing medicine). If you have no symptoms, you should stay home (isolation) for 10 days from the day of the test.    For example, if you have a fever and cough for 6 days, you need to stay home 4 more days with no fever for a total of 10 days. Or, if you have a fever and cough for 10 days, you need to stay home one more day with no fever for a total of 11 days.    If you have a high-risk exposure to COVID (you spent 15 minutes or more within six feet of somebody who has COVID), you should stay home (quarantine) for 14 days. Even if you test negative for COVID, the CDC recommends a 14-day quarantine from the time of your last exposure to that individual. There are options for a shortened (<14 day quarantine) you can review at:    https://www.health.[facility].mn.us/diseases/coronavirus/close.html#long    If you have symptoms but a negative test, you should stay at home until you are symptom-free and without fever for 24 hours, using the same judgment you would for when it is safe to return to work/school from strep throat, influenza, or the common cold. If you worsen, you should consider being re-evaluated.    If you are being tested for COVID and your test is pending, you should stay home until you know your test result.    How should I protect myself and others?    Do not go to work or school. Have a friend or relative do your shopping. Do not use public  transportation (bus, train) or ridesharing (Lyft, Uber).    Separate yourself from other people in your home. As much as possible, you should stay in one room and away from other people in your home. Also, use a separate bathroom, if possible. Avoid handling pets or other animals while sick.     Wear a facemask if you need to be around other people and cover your mouth and nose with a tissue when you cough or sneeze.     Avoid sharing personal household items. You should not share dishes, drinking glasses, forks/knives/spoons, towels, or bedding with other people in your home. After using these items, they should be washed with soap and water. Clean parts of your home that are touched often (doorknobs, faucets, countertops, etc.) daily.     Wash your hands often with soap and water for at least 20 seconds or use an alcohol-based hand  containing at least 60% alcohol.     Avoid touching your face.    Treat your symptoms. You can take Acetaminophen (Tylenol) to treat body aches and fever as needed for comfort. Ibuprofen (Advil or Motrin) can be used as well if you still have symptoms after taking Tylenol. Drink fluids. Rest.    Watch for worsening symptoms such as shortness of breath/difficulty breathing or very severe weakness.    Employers/workplaces are being asked by the Centers for Disease Control (CDC) to not request notes/documentation for you to return to work or prove that you were ill. You may choose to show your employer this paperwork. Also, repeat testing should not be required to return to work.    Return to the Emergency Department if:    If you are developing worsening breathing, shortness of breath, or feel worse you should seek medical attention.  If you are uncertain, contact your health care provider/clinic. If you need emergency medical attention, call 911 and tell them you have been ill.

## 2020-12-27 NOTE — ED PROVIDER NOTES
"  History   Chief Complaint:  Chest Pain and Back Pain     The history is provided by the patient. A  was used.      Brittany Clancy is a 35 year old female who presents with chest pain and back pain. 10 days prior, the patient developed loss of taste and smell, cough, fatigue, and headache, prompting her to quarantine herself. Today, she got her COVID test and does not have her results back yet. She notes she was wary of infecting others, hence the 10 day period between her quarantine and COVID test. 3 days prior, the patient developed right-sided chest pain, back pain, and a lump on her left mid-thoracic back. She further admitted to pain with inspiration that is bothersome but not sharp. During evaluation, she denies fever, diarrhea, emesis, shortness of breath, and abdominal pain.    Review of Systems   Constitutional: Positive for appetite change (Loss of taste and smell) and fatigue. Negative for fever.   Respiratory: Positive for cough. Negative for shortness of breath.    Cardiovascular: Positive for chest pain.   Gastrointestinal: Negative for abdominal pain, diarrhea and vomiting.   Musculoskeletal: Positive for back pain.   Neurological: Positive for headaches.   All other systems reviewed and are negative.        Allergies:  No known drug allergies    Medications:  The patient is not currently taking any prescribed medications.    Past Medical History:    Depression  Anxiety  Gastritis  Obesity     Past Surgical History:         Family History:    Diabetes  MI  Lipids  GI disease    Social History:   drove patient to the ED today.  Patient is Latvian speaking.    Physical Exam     Patient Vitals for the past 24 hrs:   BP Temp Temp src Pulse Resp SpO2 Height Weight   20 -- -- -- -- -- -- 1.575 m (5' 2\") 84.8 kg (187 lb)   20 130/73 99.8  F (37.7  C) Oral 79 18 98 % -- --       Physical Exam  Constitutional:  Oriented to person, place, and " time. Well appearing.  HENT:   Head:    Normocephalic.   Mouth/Throat:   Oropharynx is clear and moist.   Eyes:    EOM are normal. Pupils are equal, round, and reactive to light.   Neck:    Neck supple.   Cardiovascular:  Normal rate, regular rhythm and normal heart sounds.      Exam reveals no gallop and no friction rub.       No murmur heard.  Pulmonary/Chest:  Effort normal and breath sounds normal.      No respiratory distress. No wheezes. No rales.      No reproducible chest wall pain.     Mild right rib tenderness  Abdominal:   Soft. No distension. No tenderness. No rebound and no guarding.   Musculoskeletal:  Normal range of motion. No midline spinal tenderness. Mild right paraspinal tenderness.  Neurological:   Alert and oriented to person, place, and time.           Moves all 4 extremities spontaneously    Skin:    3 cm boggy sebaceous cyst at the left thoracic paraspinal region superficially.    Emergency Department Course   ECG (21:43:59):  Rate 72 bpm. MD interval 122. QRS duration 80. QT/QTc 390/427. P-R-T axes 58 46 45. NSR. Normal ECG. Interpreted at 2146 by Naman Knowles MD.    Imaging:  POC US Soft Tissue  Limited Soft Tissue Ultrasound, performed and interpreted by me.   Indication:  Skin redness warmth pain. Evaluate for cellulitis vs abscess.   Body location: back   Findings:  There is no cobblestoning suggestive of cellulitis in the evaluated area. There is a fluid collection  to suggest abscess.   IMPRESSION: Abscess/cyst     Final result by Froy Boland MD (12/26/20 23:34:05)    CT Chest PE w Contrast  IMPRESSION:   1.  No pulmonary embolus, aortic dissection, or aneurysm.   2.  No focal consolidation. Pneumothorax, or pleural effusion.    As read by Radiology.    Laboratory:  CBC: WNL (WBC 9.0, HGB 12.3, )  BMP: Anion 1 (L), o/w WNL (Creatinine 0.73)  2204 Troponin I <0.015   D-Dimer: 0.6 (H)  HCG: Neg.    Procedures:    Incision and Drainage     LOCATIONS:  Left thoracic  paraspinal region     ANESTHESIA:  Local field block using Lidocaine 1% with epinephrine, total of 2 mLs, along with fentanyl 100 mcg IV.     PREPARATION:  Cleansed with Betadine     PROCEDURE:  Area was incised with # 11 Blade (Sharp [place]) with a Single Straight incision.  Wound treatment included Purulent Drainage.  Packing consisted of No Packing.  Appropriate dressing was applied to cover the area.    PATIENT STATUS:        Patient tolerated the procedure well. There were no complications.        Emergency Department Course:  Reviewed:  I reviewed the patient's nursing notes, vitals, past medical records, Care Everywhere.     Assessments:  1110: I performed an exam of the patient and obtained history, as documented above.  0050: Procedure performed as noted above.    Interventions:  2347 Toradol 10 mg IV  2347 NS 1L IV Bolus  0051 Fentanyl 100 mcg IV    Disposition:  The patient was discharged to home.     Impression & Plan   Medical Decision Making:  Brittany Clancy is a 35 year old female who came in complaining of back pain, chest pain, as well as cyst on her back with symptoms that sound COVID-like. She had a COVID test earlier today. Differentials include COVID-19, pneumonia, pleurisy, PE, referred back pain, ACS equivalent and other causes. Workup thus far showed an EKG with no significant changes. Negative troponin with positive d-dimer. CT is fortunately negative for pneumonia and PE. I attribute her symptoms likely to pleurisy, although there certainly maybe have a musculoskeletal component to it as well. In regards to her cyst, she did have a fluid noted on US, therefore an I&D was performed, as noted in procedure notes. She has no erythema, no cellulitis, and does not need antibiotics. She has been given wound care instructions and otherwise safe for discharge. Please return for worsening pain or if symptoms worsen. Follow-up with primary care provider.    Diagnosis:    ICD-10-CM    1.  Atypical chest pain  R07.89    2. Acute back pain, unspecified back location, unspecified back pain laterality  M54.9    3. Suspected COVID-19 virus infection  Z20.828    4. Infected sebaceous cyst  L72.3     L08.9        Discharge Medications:  New Prescriptions    CYCLOBENZAPRINE (FLEXERIL) 10 MG TABLET    Take 1 tablet (10 mg) by mouth 3 times daily as needed       Scribe Disclosure:  I, Evin Tang, am serving as a scribe at 11:04 PM on 12/26/2020 to document services personally performed by Froy Boland MD based on my observations and the provider's statements to me.            Froy Boland MD  12/27/20 0549

## 2020-12-27 NOTE — ED TRIAGE NOTES
Patient here for right sided chest pain and back pain started today associated with unable to take deep breaths. stated that there is a lump to mid upper back. Activity worsen chest pain. Stated diagnose with covid about 10 days ago. ABCs intact.

## 2020-12-27 NOTE — ED NOTES
Pt discharge instructions reviewed with pt via ipad . Pt verbalizes understanding of need to isolate until results of covid swab obtained. Pt verbalizes understanding of wound care, and follow up. Pt discharge to lobby ambulating well to await ride from

## 2020-12-28 ENCOUNTER — OFFICE VISIT (OUTPATIENT)
Dept: INTERNAL MEDICINE | Facility: CLINIC | Age: 35
End: 2020-12-28
Payer: COMMERCIAL

## 2020-12-28 ENCOUNTER — APPOINTMENT (OUTPATIENT)
Dept: INTERPRETER SERVICES | Facility: CLINIC | Age: 35
End: 2020-12-28
Payer: COMMERCIAL

## 2020-12-28 VITALS
BODY MASS INDEX: 35.3 KG/M2 | OXYGEN SATURATION: 99 % | TEMPERATURE: 100.3 F | RESPIRATION RATE: 16 BRPM | DIASTOLIC BLOOD PRESSURE: 69 MMHG | HEART RATE: 86 BPM | WEIGHT: 193 LBS | SYSTOLIC BLOOD PRESSURE: 105 MMHG

## 2020-12-28 DIAGNOSIS — L72.3 SEBACEOUS CYST: Primary | ICD-10-CM

## 2020-12-28 DIAGNOSIS — R50.9 LOW GRADE FEVER: ICD-10-CM

## 2020-12-28 PROCEDURE — 99203 OFFICE O/P NEW LOW 30 MIN: CPT | Performed by: INTERNAL MEDICINE

## 2020-12-28 NOTE — PROGRESS NOTES
Patient's instructions / PLAN:                                                      Plan:  1. Your provider has referred you to: [facility], [place] [phone]        ASSESSMENT & PLAN:                                                      (L72.3) Sebaceous cyst  (primary encounter diagnosis)  Comment: healing well   Plan: GENERAL SURG ADULT REFERRAL          (R50.9) Low grade fever  Comment:    -- unrelated with the cyst because the cyst is healing well   -- suspect Covid since she has some muscle aches  -- flexeril prescribed in ER - makes her sleepy. I advised her to take only 1/2 tab at night prn  --  eval in ER. Covid result done at a diff facility - results are pending   -- suspect Covid  Plan: find the Covid results        Chief Complaint:                                                      Sebaceous cyst       SUBJECTIVE:                                                    History of present illness     Sebaceous cyst   -- drained in ER  -- exam: on the L post thorax sebaceous cyst     ROS:                                                      ROS: negative for fever, chills, cough, wheezes, chest pain, shortness of breath, vomiting, abdominal pain, leg swelling     OBJECTIVE:                                                    Physical Exam :    Blood pressure 105/69, pulse 86, temperature 100.3  F (37.9  C), resp. rate 16, weight 87.5 kg (193 lb), last menstrual period 2020, SpO2 99 %, not currently breastfeeding.   NAD, appears comfortable  as above     PMHx: reviewed  Past Medical History:   Diagnosis Date     Depression with anxiety      Gastritis      Hip pain     coccyx about 4 months after pregnant       PSHx: reviewed  Past Surgical History:   Procedure Laterality Date      SECTION N/A 3/28/2016    Procedure:  SECTION;  Surgeon: Rajwinder Aly MD;  Location:  OR     NO HISTORY OF SURGERY          Meds: reviewed  Current Outpatient  Medications   Medication Sig Dispense Refill     cyclobenzaprine (FLEXERIL) 10 MG tablet Take 1 tablet (10 mg) by mouth 3 times daily as needed 20 tablet 0     HYDROcodone-acetaminophen (NORCO) 5-325 MG per tablet Take 1-2 tablets by mouth every 4 hours as needed for pain 10 tablet 0     Prenatal Vit-Fe Fumarate-FA (PRENATAL MULTIVITAMIN  PLUS IRON) 27-0.8 MG TABS Take 1 tablet by mouth daily         Soc Hx: reviewed  Fam Hx: reviewed          Joann Castaneda MD  Internal Medicine

## 2020-12-28 NOTE — PATIENT INSTRUCTIONS
Plan:  1. Your provider has referred you to: [facility], [place] [phone]

## 2021-01-04 ENCOUNTER — TELEPHONE (OUTPATIENT)
Dept: INTERNAL MEDICINE | Facility: CLINIC | Age: 36
End: 2021-01-04

## 2021-01-04 ENCOUNTER — APPOINTMENT (OUTPATIENT)
Dept: INTERPRETER SERVICES | Facility: CLINIC | Age: 36
End: 2021-01-04
Payer: COMMERCIAL

## 2021-01-04 NOTE — TELEPHONE ENCOUNTER
Call from patient with assistance of , states that patient tested positive for covid 12/27/20, patient's symptoms began on 12/17/20. All symptoms have subsided since 12/25/20. Patient took her 2nd covid test yesterday and still tested positive. Per patient  tested negative but is unable to return to work unless wife tests negative. Patient needs a letter stating that she has been symptom free and is still testing positive but is not longer contagious and patient's  can return to work. Patient requesting to schedule in-clinic appointment. Assisted in scheduling.     Next 5 appointments (look out 90 days)    Jan 06, 2021  7:20 AM  SHORT with Jessie Hernandes NP  [facility] ([facility] [address]  [facility] [address]  [phone]   Jan 08, 2021  9:00 AM  CONSULT with Ramón High MD, VIDEO,   [facility]) [address]  [facility] [id number] [phone]   Hussein 15, 2021  4:40 PM  PHYSICAL with Joann Amanda MD  [facility] ([facility]) [address]  [facility] [address]  [phone]

## 2021-01-06 ENCOUNTER — VIRTUAL VISIT (OUTPATIENT)
Dept: INTERNAL MEDICINE | Facility: CLINIC | Age: 36
End: 2021-01-06
Payer: COMMERCIAL

## 2021-01-06 DIAGNOSIS — R05.9 COUGH: Primary | ICD-10-CM

## 2021-01-06 PROCEDURE — 99212 OFFICE O/P EST SF 10 MIN: CPT | Mod: 95 | Performed by: NURSE PRACTITIONER

## 2021-01-06 RX ORDER — ALBUTEROL SULFATE 90 UG/1
2 AEROSOL, METERED RESPIRATORY (INHALATION) EVERY 6 HOURS PRN
Qty: 1 INHALER | Refills: 1 | Status: SHIPPED | OUTPATIENT
Start: 2021-01-06 | End: 2021-01-15

## 2021-01-06 NOTE — PROGRESS NOTES
Brittany Clancy is a 35 year old female who is being evaluated via a billable telephone visit.      What phone number would you like to be contacted at?   How would you like to obtain your AVS?       Subjective     Brittany Clancy is a 35 year old who presents to clinic today for the following health issues     HPI       Concern - lingering cough  Onset: Dec 17, 2020   Positive Covid 12/27/20  Description: loose cough, afebrile, denies SOB/NAYLOR  Intensity: moderate  Progression of Symptoms:  intermittent  Accompanying Signs & Symptoms: none  Previous history of similar problem: ED visit 12/26/20 normal chest CT  Precipitating factors:        Worsened by: none  Alleviating factors:        Improved by: none  Therapies tried and outcome:  none         Review of Systems   CONSTITUTIONAL: NEGATIVE for fever, chills, change in weight  ENT/MOUTH: POSITIVE for problem swallowing foods  RESP:POSITIVE for cough-non productive  CV: NEGATIVE for chest pain, palpitations or peripheral edema      Objective           Vitals:  None for VV    Physical Exam     PSYCH: Alert and oriented times 3; coherent speech, normal   rate and volume, able to articulate logical thoughts, able   to abstract reason, no tangential thoughts, no hallucinations   or delusions  Her affect is normal  RESP: No cough, no audible wheezing, able to talk in full sentences  Remainder of exam unable to be completed due to telephone visits      (R05) Cough  (primary encounter diagnosis)  Comment:   Plan: albuterol (PROAIR HFA/PROVENTIL HFA/VENTOLIN         HFA) 108 (90 Base) MCG/ACT inhaler            Letter per pt request has completed quarantine                Phone call duration: 15 minutes

## 2021-01-06 NOTE — LETTER
January 6, 2021      Brittany Clancy  [address]    [facility] [address]        To Whom It May Concern:    Brittany Clancy was seen in our clinic. She has completed Covid positive quarantine and is no longer infectious. She may return to work with no restrictions.        Sincerely,        Jessie Hernandes NP

## 2021-01-08 ENCOUNTER — OFFICE VISIT (OUTPATIENT)
Dept: SURGERY | Facility: CLINIC | Age: 36
End: 2021-01-08
Payer: COMMERCIAL

## 2021-01-08 ENCOUNTER — OFFICE VISIT (OUTPATIENT)
Dept: URGENT CARE | Facility: URGENT CARE | Age: 36
End: 2021-01-08
Payer: COMMERCIAL

## 2021-01-08 ENCOUNTER — ANCILLARY PROCEDURE (OUTPATIENT)
Dept: GENERAL RADIOLOGY | Facility: CLINIC | Age: 36
End: 2021-01-08
Attending: FAMILY MEDICINE
Payer: COMMERCIAL

## 2021-01-08 VITALS
WEIGHT: 193 LBS | BODY MASS INDEX: 35.51 KG/M2 | HEIGHT: 62 IN | OXYGEN SATURATION: 99 % | DIASTOLIC BLOOD PRESSURE: 64 MMHG | RESPIRATION RATE: 16 BRPM | SYSTOLIC BLOOD PRESSURE: 102 MMHG | HEART RATE: 75 BPM

## 2021-01-08 VITALS
WEIGHT: 193 LBS | DIASTOLIC BLOOD PRESSURE: 74 MMHG | BODY MASS INDEX: 35.3 KG/M2 | SYSTOLIC BLOOD PRESSURE: 116 MMHG | OXYGEN SATURATION: 99 % | HEART RATE: 80 BPM | TEMPERATURE: 99.3 F

## 2021-01-08 DIAGNOSIS — J02.9 SORETHROAT: ICD-10-CM

## 2021-01-08 DIAGNOSIS — L72.3 SEBACEOUS CYST: Primary | ICD-10-CM

## 2021-01-08 DIAGNOSIS — R07.89 CHEST TIGHTNESS: Primary | ICD-10-CM

## 2021-01-08 DIAGNOSIS — K21.9 GASTROESOPHAGEAL REFLUX DISEASE WITHOUT ESOPHAGITIS: ICD-10-CM

## 2021-01-08 LAB
BASOPHILS # BLD AUTO: 0 10E9/L (ref 0–0.2)
BASOPHILS NFR BLD AUTO: 0.4 %
DIFFERENTIAL METHOD BLD: NORMAL
EOSINOPHIL # BLD AUTO: 0.2 10E9/L (ref 0–0.7)
EOSINOPHIL NFR BLD AUTO: 3.1 %
ERYTHROCYTE [DISTWIDTH] IN BLOOD BY AUTOMATED COUNT: 12.3 % (ref 10–15)
HCT VFR BLD AUTO: 36.9 % (ref 35–47)
HGB BLD-MCNC: 11.8 G/DL (ref 11.7–15.7)
LYMPHOCYTES # BLD AUTO: 2 10E9/L (ref 0.8–5.3)
LYMPHOCYTES NFR BLD AUTO: 29.4 %
MCH RBC QN AUTO: 30.6 PG (ref 26.5–33)
MCHC RBC AUTO-ENTMCNC: 32 G/DL (ref 31.5–36.5)
MCV RBC AUTO: 96 FL (ref 78–100)
MONOCYTES # BLD AUTO: 0.6 10E9/L (ref 0–1.3)
MONOCYTES NFR BLD AUTO: 8.3 %
NEUTROPHILS # BLD AUTO: 4 10E9/L (ref 1.6–8.3)
NEUTROPHILS NFR BLD AUTO: 58.8 %
PLATELET # BLD AUTO: 361 10E9/L (ref 150–450)
RBC # BLD AUTO: 3.86 10E12/L (ref 3.8–5.2)
WBC # BLD AUTO: 6.9 10E9/L (ref 4–11)

## 2021-01-08 PROCEDURE — 85025 COMPLETE CBC W/AUTO DIFF WBC: CPT | Performed by: FAMILY MEDICINE

## 2021-01-08 PROCEDURE — 36415 COLL VENOUS BLD VENIPUNCTURE: CPT | Performed by: FAMILY MEDICINE

## 2021-01-08 PROCEDURE — 99214 OFFICE O/P EST MOD 30 MIN: CPT | Performed by: FAMILY MEDICINE

## 2021-01-08 PROCEDURE — 99203 OFFICE O/P NEW LOW 30 MIN: CPT | Performed by: SURGERY

## 2021-01-08 PROCEDURE — 71046 X-RAY EXAM CHEST 2 VIEWS: CPT | Performed by: RADIOLOGY

## 2021-01-08 RX ORDER — CYCLOBENZAPRINE HCL 10 MG
10 TABLET ORAL 3 TIMES DAILY
COMMUNITY
Start: 2020-12-27 | End: 2021-01-08

## 2021-01-08 RX ORDER — PANTOPRAZOLE SODIUM 40 MG/1
40 TABLET, DELAYED RELEASE ORAL DAILY
Qty: 30 TABLET | Refills: 1 | Status: SHIPPED | OUTPATIENT
Start: 2021-01-08 | End: 2021-06-09

## 2021-01-08 ASSESSMENT — MIFFLIN-ST. JEOR: SCORE: 1518.69

## 2021-01-08 NOTE — LETTER
2021    RE: Brittany Clancy, : 1985      I was asked by Dr. Castaneda to evaluate this patient regarding a recent cyst.  The patient was seen in the emergency room about 2 weeks ago and was found to have a fluid-filled cyst on her back.  Incision and drainage was performed.  She now presents to discuss whether definitive decision is required.  She denies any current pain.  She denies any fever or chills.    Review of systems is negative for fever or chills.  Negative for ongoing pain at the site of her recent cyst drainage.     Physical exam:  The patient is in no apparent distress.  Breathing is nonlabored.  The back reveals a 1 cm area of slight fullness which is completely healed.  There is no obvious fluctuance.  The patient indicates that this is the area where she had the cyst.     Assessment and plan: This is a patient with a recent inflamed cyst which was drained in the emergency room.  This now appears to have calm down nicely.  It is unclear at this point whether there is any residual cyst.  I explained that it takes a couple of months for acute swelling to calm down.  If the patient has a continued lump or an enlarging lump at that time, we should reevaluate it.  If this is completely gone at that time, it may be that we do not need to further removal.  I am happy to take a look at this in 2 or 3 months.  At this point, the patient indicates that she will come back if she is noticing any problem.  The patient is seen today with a phone .          Ramón High MD  [facility]

## 2021-01-08 NOTE — PROGRESS NOTES
I was asked by Dr. Castaneda to evaluate this patient regarding a recent cyst.  The patient was seen in the emergency room about 2 weeks ago and was found to have a fluid-filled cyst on her back.  Incision and drainage was performed.  She now presents to discuss whether definitive decision is required.  She denies any current pain.  She denies any fever or chills.    Past Medical History:   Diagnosis Date     Depression with anxiety      Gastritis      Hip pain     coccyx about 4 months after pregnant      Past Surgical History:   Procedure Laterality Date      SECTION N/A 3/28/2016    Procedure:  SECTION;  Surgeon: Rajwinder Aly MD;  Location: [facility] OR     NO HISTORY OF SURGERY       Review of systems is negative for fever or chills.  Negative for ongoing pain at the site of her recent cyst drainage.    Physical exam:  The patient is in no apparent distress.  Breathing is nonlabored.  The back reveals a 1 cm area of slight fullness which is completely healed.  There is no obvious fluctuance.  The patient indicates that this is the area where she had the cyst.    Assessment and plan: This is a patient with a recent inflamed cyst which was drained in the emergency room.  This now appears to have calm down nicely.  It is unclear at this point whether there is any residual cyst.  I explained that it takes a couple of months for acute swelling to calm down.  If the patient has a continued lump or an enlarging lump at that time, we should reevaluate it.  If this is completely gone at that time, it may be that we do not need to further removal.  I am happy to take a look at this in 2 or 3 months.  At this point, the patient indicates that she will come back if she is noticing any problem.  The patient is seen today with a phone .    Total time spent today in chart review, face-to-face with the patient and documentation is 30 minutes.    Ramón High MD  [facility]    Please route or send letter to:  Referring Provider

## 2021-01-08 NOTE — PROGRESS NOTES
Chief Complaint   Patient presents with     Abdominal Pain     feels like food is stuck inside her throat - tight chest- feels like her spit travels slowly -      Covid Concern     12/26/2020 -     Gastrophageal Reflux     13 years - had a reflux issue      Had a saliva test for covid and was positive   Medical Decision Making:    Differential Diagnosis:  Gastro: gastritis/heartburn/viral uri/pneumonia     (R07.89) Chest tightness  (primary encounter diagnosis)  Comment: chest xray WNL  Plan: XR Chest 2 Views, CBC with platelets and         differential        (J02.9) Sorethroat  Comment: could be from reflux   Plan: symptomatic treatment     (K21.9) Gastroesophageal reflux disease without esophagitis  Comment:  Plan: Helicobacter pylori Antigen Stool, lidocaine         (XYLOCAINE) 2 % 15 mL, alum & mag         hydroxide-simethicone (MAALOX) 15 mL GI         Cocktail, pantoprazole (PROTONIX) 40 MG EC         tablet        GI cocktail did help her           Review of external notes as documented above   Review of prior external note(s) from - reviewed all records from recent ER visit   Review of the result(s) of each unique test -chest xray was WNL       35 minutes spent on the date of the encounter doing chart review, review of test results, interpretation of tests, patient visit and documentation       If not improving or if condition worsens, follow up with your Primary Care Provider, If not improving or if conditions worsens over the next 12-24 hours, go to the Emergency Department      SUBJECTIVE:   Brittany Clancy is a 35 year old female presenting with a chief complaint of burping and food stuck in throat She is an established patient of [facility].  Onset of symptoms was 5 day(s) ago.she had covid diagnosed on 12/27/2021and done with quarantine   Course of illness is worsening.    Severity moderate  Current and Associated symptoms: burping  Treatment measures tried include prilosec with mild improvement  in symptoms  Predisposing factors include None.  Full PPE was worn to see pt   Past Medical History:   Diagnosis Date     Depression with anxiety      Gastritis      Hip pain     coccyx about 4 months after pregnant      Current Outpatient Medications   Medication Sig Dispense Refill     albuterol (PROAIR HFA/PROVENTIL HFA/VENTOLIN HFA) 108 (90 Base) MCG/ACT inhaler Inhale 2 puffs into the lungs every 6 hours as needed for shortness of breath / dyspnea or wheezing 1 Inhaler 1     omeprazole (PRILOSEC) 20 MG DR capsule Take 20 mg by mouth daily       Prenatal Vit-Fe Fumarate-FA (PRENATAL MULTIVITAMIN  PLUS IRON) 27-0.8 MG TABS Take 1 tablet by mouth daily       Social History     Tobacco Use     Smoking status: Never Smoker     Smokeless tobacco: Never Used   Substance Use Topics     Alcohol use: No     Family History   Problem Relation Age of Onset     Diabetes Father      Heart Disease Father 51        MI      Diabetes Paternal Grandmother      Hypertension Paternal Grandmother      Lipids Mother      Gastrointestinal Disease Mother         Gastric ulcer     Cancer No family hx of          ROS:    10 point ROS of systems including Constitutional, Eyes, Respiratory, Cardiovascular, Genitourinary, Integumentary, Muscularskeletal, Psychiatric ,neurological were all negative except for pertinent positives noted in my HPI       OBJECTIVE:  /74   Pulse 80   Temp 99.3  F (37.4  C)   Wt 87.5 kg (193 lb)   SpO2 99%   BMI 35.30 kg/m    GENERAL APPEARANCE: healthy, alert and no distress  EYES: EOMI,  PERRL, conjunctiva clear  HENT: ear canals and TM's normal.  Nose and mouth without ulcers, erythema or lesions  NECK: supple, nontender, no lymphadenopathy  RESP: lungs clear to auscultation - no rales, rhonchi or wheezes  CV: regular rates and rhythm, normal S1 S2, no murmur noted  ABDOMEN:  soft, nontender, no HSM or masses and bowel sounds normal  SKIN: no suspicious lesions or rashes  PSYCH: mentation appears  normal  Physical Exam      X-Ray was done, my findings are:WNL       (Note was completed, in part, with [employer] voice-recognition software. Documentation reviewed, but some grammatical, spelling, and word errors may remain.)      Lori Arango MD

## 2021-01-09 ENCOUNTER — HOSPITAL ENCOUNTER (OUTPATIENT)
Dept: LAB | Facility: CLINIC | Age: 36
Discharge: HOME | End: 2021-01-09
Attending: FAMILY MEDICINE | Admitting: FAMILY MEDICINE
Payer: COMMERCIAL

## 2021-01-09 DIAGNOSIS — K21.9 GASTROESOPHAGEAL REFLUX DISEASE WITHOUT ESOPHAGITIS: ICD-10-CM

## 2021-01-09 PROCEDURE — 87338 HPYLORI STOOL AG IA: CPT | Performed by: FAMILY MEDICINE

## 2021-01-11 ENCOUNTER — TELEPHONE (OUTPATIENT)
Dept: URGENT CARE | Facility: URGENT CARE | Age: 36
End: 2021-01-11

## 2021-01-11 DIAGNOSIS — A04.8 H. PYLORI INFECTION: Primary | ICD-10-CM

## 2021-01-11 LAB — H PYLORI AG STL QL IA: POSITIVE

## 2021-01-11 RX ORDER — CLARITHROMYCIN 500 MG
500 TABLET ORAL 2 TIMES DAILY
Qty: 20 TABLET | Refills: 0
Start: 2021-01-11 | End: 2021-01-21

## 2021-01-11 RX ORDER — AMOXICILLIN 500 MG/1
1000 CAPSULE ORAL 2 TIMES DAILY
Qty: 40 CAPSULE | Refills: 0
Start: 2021-01-11 | End: 2021-01-21

## 2021-01-11 NOTE — TELEPHONE ENCOUNTER
Please call pt and inform of positive H Pylori and call in Biaxin and Amox to pts preferred Pharmacy.

## 2021-01-12 ENCOUNTER — APPOINTMENT (OUTPATIENT)
Dept: INTERPRETER SERVICES | Facility: CLINIC | Age: 36
End: 2021-01-12
Payer: COMMERCIAL

## 2021-01-12 NOTE — TELEPHONE ENCOUNTER
Call patient about her lab results, but patient cant understand english, she will get an  and call us back.   Alexis Chavez MA

## 2021-01-12 NOTE — TELEPHONE ENCOUNTER
Patient called back with , she was notified of Dr. Jonny López's message. She understood, and her two prescriptions were called into [facility] to # [phone] today.  Sandra Humphrey LPN

## 2021-01-13 ENCOUNTER — APPOINTMENT (OUTPATIENT)
Dept: INTERPRETER SERVICES | Facility: CLINIC | Age: 36
End: 2021-01-13
Payer: COMMERCIAL

## 2021-01-15 ENCOUNTER — OFFICE VISIT (OUTPATIENT)
Dept: INTERNAL MEDICINE | Facility: CLINIC | Age: 36
End: 2021-01-15
Payer: COMMERCIAL

## 2021-01-15 VITALS
OXYGEN SATURATION: 100 % | HEART RATE: 89 BPM | DIASTOLIC BLOOD PRESSURE: 71 MMHG | RESPIRATION RATE: 20 BRPM | TEMPERATURE: 98.4 F | BODY MASS INDEX: 35.31 KG/M2 | SYSTOLIC BLOOD PRESSURE: 109 MMHG | WEIGHT: 191.9 LBS | HEIGHT: 62 IN

## 2021-01-15 DIAGNOSIS — R10.84 ABDOMINAL PAIN, GENERALIZED: ICD-10-CM

## 2021-01-15 DIAGNOSIS — Z00.00 ROUTINE GENERAL MEDICAL EXAMINATION AT A HEALTH CARE FACILITY: Primary | ICD-10-CM

## 2021-01-15 DIAGNOSIS — Z12.4 SCREENING FOR MALIGNANT NEOPLASM OF CERVIX: ICD-10-CM

## 2021-01-15 DIAGNOSIS — A04.8 H. PYLORI INFECTION: ICD-10-CM

## 2021-01-15 DIAGNOSIS — N89.8 VAGINAL ITCHING: ICD-10-CM

## 2021-01-15 DIAGNOSIS — R30.0 DYSURIA: ICD-10-CM

## 2021-01-15 LAB
ALBUMIN UR-MCNC: NEGATIVE MG/DL
APPEARANCE UR: CLEAR
BILIRUB UR QL STRIP: NEGATIVE
COLOR UR AUTO: YELLOW
GLUCOSE UR STRIP-MCNC: NEGATIVE MG/DL
HGB UR QL STRIP: NEGATIVE
KETONES UR STRIP-MCNC: NEGATIVE MG/DL
LEUKOCYTE ESTERASE UR QL STRIP: NEGATIVE
NITRATE UR QL: NEGATIVE
PH UR STRIP: 6.5 PH (ref 5–7)
SOURCE: NORMAL
SP GR UR STRIP: 1.01 (ref 1–1.03)
UROBILINOGEN UR STRIP-ACNC: 0.2 EU/DL (ref 0.2–1)

## 2021-01-15 PROCEDURE — 99213 OFFICE O/P EST LOW 20 MIN: CPT | Mod: 25 | Performed by: INTERNAL MEDICINE

## 2021-01-15 PROCEDURE — 87624 HPV HI-RISK TYP POOLED RSLT: CPT | Performed by: INTERNAL MEDICINE

## 2021-01-15 PROCEDURE — G0145 SCR C/V CYTO,THINLAYER,RESCR: HCPCS | Performed by: INTERNAL MEDICINE

## 2021-01-15 PROCEDURE — 99395 PREV VISIT EST AGE 18-39: CPT | Performed by: INTERNAL MEDICINE

## 2021-01-15 PROCEDURE — 81003 URINALYSIS AUTO W/O SCOPE: CPT | Performed by: INTERNAL MEDICINE

## 2021-01-15 ASSESSMENT — MIFFLIN-ST. JEOR: SCORE: 1513.7

## 2021-01-15 NOTE — PATIENT INSTRUCTIONS
Plan:  1. Stop the antibiotic for this weekend. If your stomach feels better resume then on Monday  2. Miconazole cream - for itchiness  3. Advised her to go to ER or UC if abd pain persists

## 2021-01-15 NOTE — PROGRESS NOTES
Dr Castaneda's note    Patient's instructions / PLAN:                                                        Plan:  1. Stop the antibiotic for this weekend. If your stomach feels better resume then on Monday  2. Miconazole cream - for itchiness  3. Advised her to go to ER or UC if abd pain persists      ASSESSMENT & PLAN:                                                      Brittany is 34 y/o who had Covid infection in mid December with some mild cough and low-grade temp.  She recovered from the symptoms.  A week ago she was diagnosed with H. pylori infection and she started the treatment with amoxicillin clarithromycin and Protonix on January 11.  She gets stomach pain and nausea after she takes these medications.  For the last couple of days she noticed vaginal itching and some mild whitish discharge.  Before this visit she went to the bathroom and she developed lower abdominal pain.   On the pelvic exam she has lower abdominal pain, but I am not sure if it is the bowel or the uterus.  Unfortunately the lab closes sooner than my schedule and I did not obtain specimens for wet prep and GC.  I suspect she might have yeast vaginitis from the medications.  I am not sure how to explain the lower abdominal pain, but I suspect it is also related with this medications.    (Z00.00) Routine general medical examination at a health care facility  (primary encounter diagnosis)  Comment:   Plan: Comprehensive metabolic panel, Hemoglobin A1c,         CBC with platelets, Lipid panel reflex to         direct LDL Fasting, TSH with free T4 reflex            (R30.0) Dysuria  Comment: No signs of UTI on today UA  Plan: UA reflex to Microscopic and Culture            (Z12.4) Screening for malignant neoplasm of cervix  Comment:   Plan: HPV High Risk Types DNA Cervical, Pap imaged         thin layer screen with HPV - recommended age 30        - 65 years (select HPV order below)            (R10.84) Abdominal pain, generalized  Comment:   Plan:  Comprehensive metabolic panel, Hemoglobin A1c,         CBC with platelets, Lipid panel reflex to         direct LDL Fasting, TSH with free T4 reflex            (A04.8) H. pylori infection  Comment:   Plan:        Chief Complaint:                                                        Annual exam  Follow up chronic medical problems    Due to language barrier, an  on the phone was present during the history-taking and subsequent discussion (and for part of the physical exam) with this patient.     SUBJECTIVE:                                                    History of present illness     We reviewed the chronic medical problems as above.   I reviewed the recent tests results in Epic     GI    abd pain  -- just started after the she urinated today  -- today vaginal discharge and itchiness  -- she started tx for H Pylori on Jan 11 Amoxi, Clarithromycin         ROS:   General: Negative for fever, chills, major weight changes, fatigue  Skin: Negative for rashes, abnormal spots  Eyes: Negative for blurred or double vision  ENT/mouth: Negative for sinuses discomfort, earache, sore throat  Respiratory: Negative for cough, wheezes, chronic lung disease  Cardiovascular: Negative for rest or exertional chest pain, shortness of breath, palpitations, leg edema,   Gastrointestinal: Negative for vomiting, , heartburn, blood in stool, diarrhea, constipation.  Positive for abdominal pain as above  Genitourinary: Negative for urinary frequency, blood in urine, history of kidney stones  Female: Negative for abnormal vaginal bleeding, positive for itching and vaginal discharge  Neuro: Negative for headaches, numbness, tingling, weakness in arms or legs, history of seizure, recent syncope  Psychiatry: Negative for depression, anxiety, suicidal thoughts  Endo: Negative for known thyroid disease, diabetes.  Hemato/Lymph: Negative for nodes, easy bleeding, history of DVT, blood transfusion  Musculoskeletal: Negative for joint  swelling, back pain      PMHx: - reviewed  Past Medical History:   Diagnosis Date     Depression with anxiety      Gastritis      Hip pain     coccyx about 4 months after pregnant        PSHx: reviewed  Past Surgical History:   Procedure Laterality Date      SECTION N/A 3/28/2016    Procedure:  SECTION;  Surgeon: Rajwinder Aly MD;  Location: [facility] OR     NO HISTORY OF SURGERY          Soc Hx: No daily alcohol, no smoking  Social History     Socioeconomic History     Marital status:      Spouse name: Not on file     Number of children: Not on file     Years of education: Not on file     Highest education level: Not on file   Occupational History     Not on file   Social Needs     Financial resource strain: Not on file     Food insecurity     Worry: Not on file     Inability: Not on file     Transportation needs     Medical: Not on file     Non-medical: Not on file   Tobacco Use     Smoking status: Never Smoker     Smokeless tobacco: Never Used   Substance and Sexual Activity     Alcohol use: No     Drug use: No     Sexual activity: Yes     Partners: Male   Lifestyle     Physical activity     Days per week: Not on file     Minutes per session: Not on file     Stress: Not on file   Relationships     Social connections     Talks on phone: Not on file     Gets together: Not on file     Attends [employer]ism service: Not on file     Active member of club or organization: Not on file     Attends meetings of clubs or organizations: Not on file     Relationship status: Not on file     Intimate partner violence     Fear of current or ex partner: Not on file     Emotionally abused: Not on file     Physically abused: Not on file     Forced sexual activity: Not on file   Other Topics Concern     Parent/sibling w/ CABG, MI or angioplasty before 65F 55M? Yes     Comment: father 3 MIs before age 51   Social History Narrative     Not on file        Fam Hx: reviewed  Family History   Problem Relation  "Age of Onset     Diabetes Father      Heart Disease Father 51        MI      Diabetes Paternal Grandmother      Hypertension Paternal Grandmother      Lipids Mother      Gastrointestinal Disease Mother         Gastric ulcer     No Known Problems Sister      No Known Problems Brother      No Known Problems Daughter      Cancer No family hx of          Screening: reviewed      All: reviewed    Meds: reviewed  Current Outpatient Medications   Medication Sig Dispense Refill     amoxicillin (AMOXIL) 500 MG capsule Take 2 capsules (1,000 mg) by mouth 2 times daily for 10 days 40 capsule 0     clarithromycin (BIAXIN) 500 MG tablet Take 1 tablet (500 mg) by mouth 2 times daily for 10 days 20 tablet 0     pantoprazole (PROTONIX) 40 MG EC tablet Take 1 tablet (40 mg) by mouth daily 30 tablet 1       OBJECTIVE:                                                    Physical Exam :  Blood pressure 109/71, pulse 89, temperature 98.4  F (36.9  C), temperature source Oral, resp. rate 20, height 1.575 m (5' 2\"), weight 87 kg (191 lb 14.4 oz), last menstrual period 01/01/2021, SpO2 100 %, not currently breastfeeding.     NAD, appears comfortable  Skin clear, no rashes  Neck: supple, no JVD,  no thyroidmegaly  Lymph nodes non palpable in the cervical, supraclavicular axillaries,   Chest: clear to auscultation with good respiratory effort  Cardiac: S1S2, RRR, no mgr appreciated  Abdomen: soft, not tender, not distended, audible bowel sound, no hepatosplenomegaly, no palpable masses, no abdominal bruits  Extremities: no cyanosis, clubbing or edema.   Neuro: A, Ox3, no focal signs.  Breast exam in supine and erect position: they are symmetrical, no skin changes, no tenderness or nodes on palpation. Nipples are erect, no skin lesions, no discharge on pressure.    Pelvic exam: Normal external genitals, normal appearing perineum, normal appearing urethra,  vaginal mucosa pink, whitish discharge, Cervix appears normal, Pap smear obtained. On " bimanual exam, I did not feel any uterus or ovarian masses, and she has lower abdominal tenderness        Joann Castaneda MD  Internal Medicine          SUBJECTIVE:   CC: Brittany Clancy is an 35 year old woman who presents for preventive health visit.       Patient has been advised of split billing requirements and indicates understanding: Yes  Healthy Habits:     Getting at least 3 servings of Calcium per day:  NO    Bi-annual eye exam:  NO    Dental care twice a year:  Yes    Sleep apnea or symptoms of sleep apnea:  None    Diet:  Regular (no restrictions)    Frequency of exercise:  None    Duration of exercise:  N/A    Taking medications regularly:  Yes    Barriers to taking medications:  None    Medication side effects:  Other (antibiotic effects her stomach and back discomfort level 4/10)    PHQ-2 Total Score: 0    Additional concerns today:  Yes (vaginal burning and discharge, itch, one day)                 Today's PHQ-2 Score:   PHQ-2 ( 1999 Pfizer) 1/15/2021   Q1: Little interest or pleasure in doing things 0   Q2: Feeling down, depressed or hopeless 0   PHQ-2 Score 0       Abuse: Current or Past (Physical, Sexual or Emotional) - No  Do you feel safe in your environment? Yes        Social History     Tobacco Use     Smoking status: Never Smoker     Smokeless tobacco: Never Used   Substance Use Topics     Alcohol use: No         Alcohol Use 7/31/2014   Prescreen: >3 drinks/day or >7 drinks/week? The patient does not drink >3 drinks per day nor >7 drinks per week.       Reviewed orders with patient.  Reviewed health maintenance and updated orders accordingly - Yes  Labs reviewed in EPIC    Mammogram not appropriate for this patient based on age.    Pertinent mammograms are reviewed under the imaging tab.  History of abnormal Pap smear:   PAP / HPV 5/16/2013   PAP NIL     Reviewed and updated as needed this visit by clinical staff  Tobacco  Allergies  Meds   Med Hx  Surg Hx  Fam Hx  Soc Hx     "    Reviewed and updated as needed this visit by Provider                    Patient has been advised of split billing requirements and indicates understanding: Yes At the check in, at the    COUNSELING:  Reviewed preventive health counseling, as reflected in patient instructions       Regular exercise       Healthy diet/nutrition    Estimated body mass index is 35.1 kg/m  as calculated from the following:    Height as of this encounter: 1.575 m (5' 2\").    Weight as of this encounter: 87 kg (191 lb 14.4 oz).    Weight management plan: Discussed healthy diet and exercise guidelines    She reports that she has never smoked. She has never used smokeless tobacco.      Counseling Resources:  ATP IV Guidelines  Pooled Cohorts Equation Calculator  Breast Cancer Risk Calculator  BRCA-Related Cancer Risk Assessment: FHS-7 Tool  FRAX Risk Assessment  ICSI Preventive Guidelines  Dietary Guidelines for Americans, 2010  USDA's MyPlate  ASA Prophylaxis  Lung CA Screening    Joann Amanda MD  [facility]  "

## 2021-01-20 LAB — COPATH REPORT: NORMAL

## 2021-01-21 ENCOUNTER — APPOINTMENT (OUTPATIENT)
Dept: INTERPRETER SERVICES | Facility: CLINIC | Age: 36
End: 2021-01-21
Payer: COMMERCIAL

## 2021-01-22 LAB
FINAL DIAGNOSIS: NORMAL
HPV HR 12 DNA CVX QL NAA+PROBE: NEGATIVE
HPV16 DNA SPEC QL NAA+PROBE: NEGATIVE
HPV18 DNA SPEC QL NAA+PROBE: NEGATIVE
SPECIMEN DESCRIPTION: NORMAL
SPECIMEN SOURCE CVX/VAG CYTO: NORMAL

## 2021-01-25 ENCOUNTER — OFFICE VISIT (OUTPATIENT)
Dept: INTERNAL MEDICINE | Facility: CLINIC | Age: 36
End: 2021-01-25
Payer: COMMERCIAL

## 2021-01-25 VITALS
TEMPERATURE: 98.3 F | DIASTOLIC BLOOD PRESSURE: 58 MMHG | OXYGEN SATURATION: 100 % | BODY MASS INDEX: 36.25 KG/M2 | SYSTOLIC BLOOD PRESSURE: 107 MMHG | HEIGHT: 62 IN | RESPIRATION RATE: 16 BRPM | WEIGHT: 197 LBS | HEART RATE: 74 BPM

## 2021-01-25 DIAGNOSIS — B37.0 THRUSH: Primary | ICD-10-CM

## 2021-01-25 DIAGNOSIS — B37.31 YEAST INFECTION OF THE VAGINA: ICD-10-CM

## 2021-01-25 DIAGNOSIS — J02.9 SORE THROAT: ICD-10-CM

## 2021-01-25 PROCEDURE — 99214 OFFICE O/P EST MOD 30 MIN: CPT | Performed by: INTERNAL MEDICINE

## 2021-01-25 RX ORDER — FLUCONAZOLE 100 MG/1
100 TABLET ORAL DAILY
Qty: 15 TABLET | Refills: 1 | Status: SHIPPED | OUTPATIENT
Start: 2021-01-25 | End: 2021-02-09

## 2021-01-25 ASSESSMENT — MIFFLIN-ST. JEOR: SCORE: 1536.84

## 2021-01-25 NOTE — PROGRESS NOTES
"  Assessment & Plan     Thrush  Will RX with Diflucan. Follow up in 1 month   - fluconazole (DIFLUCAN) 100 MG tablet; Take 1 tablet (100 mg) by mouth daily for 15 days    Sore throat  With very large tonsils. She might benefit from a tonsillectomy. But will see back in 1 month and reassess     Yeast infection of the vagina  RX above should take care of it.       Review of prior external note(s) from -       27 minutes spent on the date of the encounter doing chart review, review of test results, patient visit and documentation            Return in about 4 weeks (around 2/22/2021).    Aida Rico MD  [facility] RICHIE Soto is a 35 year old who presents to clinic today for the following health issues  Accompanied (by ipad) by her :    HPI       ED/UC Followup:    Facility:  [facility]  Date of visit: 1-8-2021  Reason for visit: reflux/h pylori       She presented on January 1/8 after being diagnosed with Covid on 12/26 with epigastric tightness and sore throat. She was started on Protonix and the next day her test came back positive for H pylori. She was treated with Amoxicillin and Biaxin. Since then her chest tightness has resolved and her sore throat but now her tongue burns when she brushes and her throat feels full when she swallows.    She also developed vaginal burning and was told to мария a cream for yeast. It is better but not gone.     She never had much for covid symptoms and says they have all resolved. Above are her only concerns.       Review of Systems   Constitutional, HEENT, cardiovascular, pulmonary, GI, , musculoskeletal, neuro, skin, endocrine and psych systems are negative, except as otherwise noted.      Objective    /58 (BP Location: Right arm, Patient Position: Chair, Cuff Size: Adult Large)   Pulse 74   Temp 98.3  F (36.8  C) (Oral)   Resp 16   Ht 1.575 m (5' 2\")   Wt 89.4 kg (197 lb)   LMP 01/01/2021 (Exact Date)   " SpO2 100%   Breastfeeding No   BMI 36.03 kg/m    Body mass index is 36.03 kg/m .  Physical Exam   GENERAL: healthy, alert and no distress  HENT: normal cephalic/atraumatic, ear canals and TM's normal and very large tonsils bilaterally with white to yellow coating on tongue.   NECK: no adenopathy, no asymmetry, masses, or scars and thyroid normal to palpation  RESP: lungs clear to auscultation - no rales, rhonchi or wheezes  CV: regular rate and rhythm, normal S1 S2, no S3 or S4, no murmur, click or rub, no peripheral edema and peripheral pulses strong  PSYCH: mentation appears normal, affect normal/bright

## 2021-01-29 ENCOUNTER — TELEPHONE (OUTPATIENT)
Dept: INTERNAL MEDICINE | Facility: CLINIC | Age: 36
End: 2021-01-29

## 2021-01-29 ENCOUNTER — APPOINTMENT (OUTPATIENT)
Dept: INTERPRETER SERVICES | Facility: CLINIC | Age: 36
End: 2021-01-29
Payer: COMMERCIAL

## 2021-01-29 ENCOUNTER — OFFICE VISIT (OUTPATIENT)
Dept: INTERNAL MEDICINE | Facility: CLINIC | Age: 36
End: 2021-01-29
Payer: COMMERCIAL

## 2021-01-29 VITALS
HEART RATE: 74 BPM | DIASTOLIC BLOOD PRESSURE: 81 MMHG | BODY MASS INDEX: 36.21 KG/M2 | WEIGHT: 198 LBS | RESPIRATION RATE: 16 BRPM | SYSTOLIC BLOOD PRESSURE: 122 MMHG | OXYGEN SATURATION: 98 %

## 2021-01-29 DIAGNOSIS — J35.01 CHRONIC TONSILLITIS: Primary | ICD-10-CM

## 2021-01-29 DIAGNOSIS — J35.1 ENLARGED TONSILS: ICD-10-CM

## 2021-01-29 DIAGNOSIS — J02.0 STREPTOCOCCAL SORE THROAT: ICD-10-CM

## 2021-01-29 DIAGNOSIS — U07.1 LAB TEST POSITIVE FOR DETECTION OF COVID-19 VIRUS: ICD-10-CM

## 2021-01-29 PROCEDURE — 99214 OFFICE O/P EST MOD 30 MIN: CPT | Performed by: INTERNAL MEDICINE

## 2021-01-29 RX ORDER — IBUPROFEN 400 MG/1
400 TABLET, FILM COATED ORAL EVERY 8 HOURS PRN
Qty: 30 TABLET | Refills: 0 | Status: SHIPPED | OUTPATIENT
Start: 2021-01-29 | End: 2021-06-09

## 2021-01-29 NOTE — PATIENT INSTRUCTIONS
Plan:  1. Augmentin 875 mg twice a day  2. Ibuprofen 400 mg 3 times a day  3. ENT referral   N: [facility] [phone]  [address] [facility]  [id number] [place] [address]    FHN: [facility] [phone]   [email or web address]  FHN: [facility] [phone]   [email or web address]  FHN: [place]  [facility] [place] [place] [phone]   [email or web address]  FHN: [place] [facility] [place] [phone]   [email or web address]  FHN: [facility] ([facility]) [facility] [phone]   [email or web address]  FHN: [facility] [phone]   [email or web address]

## 2021-01-29 NOTE — TELEPHONE ENCOUNTER
Patient calling with assistance of , states that she saw provider on 1/25/21 for throat pain, was prescribed with Diflucan, has been taking this every day but is not feeling any improvement. Per patient throat pain is not worse but it does feel like a squeezing, tightness throughout the tonsils. Patient denies fever, no nausea or vomiting. Patient is not sure if she should continue medication if not improving. Per office visit 1/25/21:  Sore throat  With very large tonsils. She might benefit from a tonsillectomy. But will see back in 1 month and reassess     Patient states she does not think she can wait 1 month without any improvement. Would like to be seen today. Assisted with scheduling appointment.     Next 5 appointments (look out 90 days)    Jan 29, 2021  1:40 PM  SHORT with Joann Amanda MD, VIDEO,   [facility] ([facility]) [address] [id number] [phone]

## 2021-01-29 NOTE — PROGRESS NOTES
Patient's instructions / PLAN:                                                        Plan:  1. Augmentin 875 mg twice a day  2. Ibuprofen 400 mg 3 times a day  3. ENT referral   N: [facility] [phone]  [address] [facility]  [address] [facility]    FHN: [facility] [phone]   [email or web address]  FHN: [facility] [phone]   [email or web address]  FHN: [place]  [facility] [place]  [facility] [phone]   [email or web address]  FHN: [place] [facility] [place] [phone]   [email or web address]  FHN: [facility] ([facility]) [facility] [phone]   [email or web address]  FHN: [facility] [phone]   [email or web address]          ASSESSMENT & PLAN:                                                        Brittany has enlarged tonsils and tonsillitis for about 3 weeks that has not improved with Amoxi and Clarythromycin Jan 9 - Jan 19 prescribed for H. pylori infection.  With such a big tonsils, the antibiotic does not have a good penetration into the tonsils.  I prescribed Augmentin.  She was diagnosed with Covid infection in December and she is still test positive.  I thought it would be helpful to test again before she makes appointment with ENT, but she does not want.  I could not find any data about Covid tonsillitis.  I advised her for ENT consult.    (J35.01) Chronic tonsillitis  (primary encounter diagnosis)  Comment:   Plan: amoxicillin-clavulanate (AUGMENTIN) 875-125 MG         tablet, OTOLARYNGOLOGY REFERRAL, ibuprofen         (ADVIL/MOTRIN) 400 MG tablet, CANCELED:         Symptomatic COVID-19 Virus (Coronavirus) by PCR            (J35.1) Enlarged tonsils  Comment:   Plan:  amoxicillin-clavulanate (AUGMENTIN) 875-125 MG         tablet, OTOLARYNGOLOGY REFERRAL, ibuprofen         (ADVIL/MOTRIN) 400 MG tablet, CANCELED:         Symptomatic COVID-19 Virus (Coronavirus) by PCR            (J02.0) Streptococcal sore throat  Comment:   Plan: CANCELED: Streptococcus A Rapid Scr w Reflx to         PCR          (U07.1) Positive COVID-19 -- Dec & Jan 2021  Comment:   Plan:        Chief Complaint:                                                      Sore Throat   Due to language barrier, an  was present on the phone during the history-taking and subsequent discussion (and for part of the physical exam) with this patient.     SUBJECTIVE:                                                    History of present illness     Sore Throat   -- since Jan 8  -- eval by dr Rico on Jan 25.  -- more pain on the R side.  -- she feels she can't swallow well     H Pylori  -- she took Antibiotics Amoxi and Clarythromycin Jan 9 - Jan 19     Tested pos for Covid 3 times: Dec, last time Jan 11.  She showed me the positive results in her iPhone.  She did not tell the  about this.    ROS:                                                      ROS: negative for fever, chills, cough, wheezes, chest pain, shortness of breath, vomiting, abdominal pain, leg swelling       OBJECTIVE:                                                    Physical Exam :    Blood pressure 122/81, pulse 74, resp. rate 16, weight 89.8 kg (198 lb), last menstrual period 01/01/2021, SpO2 98 %, not currently breastfeeding.   NAD, appears comfortable  Skin: no rashes   HEENT: Tonsils are very big, cryptic, mild erythematosus  Neck: supple, no JVD, No thyroidmegaly. Lymph nodes nonpalpable cervical and supraclavicular.  Chest: clear to auscultation bilaterally, good respiratory effort  Heart: S1 S2, RRR, no mgr appreciated  Abdomen: soft, not tender,   Extremities: no edema,   Neurologic: A, Ox3, no focal signs appreciated    PMHx:  reviewed  Past Medical History:   Diagnosis Date     Depression with anxiety      Gastritis      Hip pain     coccyx about 4 months after pregnant       PSHx: reviewed  Past Surgical History:   Procedure Laterality Date      SECTION N/A 3/28/2016    Procedure:  SECTION;  Surgeon: Rajwinder Aly MD;  Location:  OR     NO HISTORY OF SURGERY          Meds: reviewed  Current Outpatient Medications   Medication Sig Dispense Refill     fluconazole (DIFLUCAN) 100 MG tablet Take 1 tablet (100 mg) by mouth daily for 15 days 15 tablet 1     pantoprazole (PROTONIX) 40 MG EC tablet Take 1 tablet (40 mg) by mouth daily 30 tablet 1       Soc Hx: reviewed  Fam Hx: reviewed          Joann Castaneda MD  Internal Medicine

## 2021-03-05 ENCOUNTER — OFFICE VISIT (OUTPATIENT)
Dept: INTERNAL MEDICINE | Facility: CLINIC | Age: 36
End: 2021-03-05
Payer: COMMERCIAL

## 2021-03-05 ENCOUNTER — APPOINTMENT (OUTPATIENT)
Dept: INTERPRETER SERVICES | Facility: CLINIC | Age: 36
End: 2021-03-05
Payer: COMMERCIAL

## 2021-03-05 VITALS
TEMPERATURE: 98.1 F | BODY MASS INDEX: 33.13 KG/M2 | WEIGHT: 180 LBS | SYSTOLIC BLOOD PRESSURE: 99 MMHG | HEART RATE: 85 BPM | DIASTOLIC BLOOD PRESSURE: 67 MMHG | HEIGHT: 62 IN | OXYGEN SATURATION: 100 %

## 2021-03-05 DIAGNOSIS — A04.8 H. PYLORI INFECTION: ICD-10-CM

## 2021-03-05 DIAGNOSIS — K21.00 GASTROESOPHAGEAL REFLUX DISEASE WITH ESOPHAGITIS, UNSPECIFIED WHETHER HEMORRHAGE: Primary | ICD-10-CM

## 2021-03-05 PROCEDURE — 99213 OFFICE O/P EST LOW 20 MIN: CPT | Performed by: INTERNAL MEDICINE

## 2021-03-05 ASSESSMENT — MIFFLIN-ST. JEOR: SCORE: 1459.72

## 2021-03-05 NOTE — PROGRESS NOTES
"    Assessment & Plan     Gastroesophageal reflux disease with esophagitis, unspecified whether hemorrhage  Refer to GI for assessment   - GASTROENTEROLOGY ADULT REF CONSULT ONLY; Future  - Helicobacter pylori Antigen Stool; Future    H. pylori infection  Recheck H pylori   Continue PPI   - GASTROENTEROLOGY ADULT REF CONSULT ONLY; Future  - Helicobacter pylori Antigen Stool; Future             See Patient Instructions    Return in about 3 months (around 6/5/2021) for follow up on acute problem if persists.    Endy Tejeda MD  [facility] RICHIE Soto is a 35 year old who presents for the following health issues     HPI   Chief Complaint   Patient presents with     RECHECK     F/U from ENT and Otolaryngology     Phone interpretor services used       Patient is seen for a follow up visit.    Has h/o GERD on PPI  treatment. symptoms not well controlled. No nausea, vomiting, heartburns, bloating, but has had sore throat, burping, vocal cords irritation on exam with ENT.   Has had positive H pylori in January , treated. Symptoms have not improved.   No change of bowel habits. No weight loss.   Keeps strict diet. Avoids caffeine , fried foods, spicy foods, sodas.     Review of Systems   Constitutional, HEENT, cardiovascular, pulmonary, gi and gu systems are negative, except as otherwise noted.      Objective    BP 99/67 (BP Location: Left arm, Patient Position: Sitting, Cuff Size: Adult Regular)   Pulse 85   Temp 98.1  F (36.7  C) (Oral)   Ht 1.575 m (5' 2\")   Wt 81.6 kg (180 lb)   SpO2 100%   BMI 32.92 kg/m    Body mass index is 32.92 kg/m .  Physical Exam   GENERAL: healthy, alert and no distress  EYES: Eyes grossly normal to inspection, PERRL and conjunctivae and sclerae normal  HENT: ear canals and TM's normal, nose and mouth without ulcers or lesions  NECK: no adenopathy, no asymmetry, masses, or scars and thyroid normal to palpation  RESP: lungs clear to auscultation - " no rales, rhonchi or wheezes  CV: regular rate and rhythm, normal S1 S2, no S3 or S4, no murmur, click or rub, no peripheral edema and peripheral pulses strong  ABDOMEN: soft, nontender, no hepatosplenomegaly, no masses and bowel sounds normal  MS: no gross musculoskeletal defects noted, no edema  SKIN: no suspicious lesions or rashes  NEURO: Normal strength and tone, mentation intact and speech normal    Office Visit on 01/15/2021   Component Date Value Ref Range Status     Color Urine 01/15/2021 Yellow   Final     Appearance Urine 01/15/2021 Clear   Final     Glucose Urine 01/15/2021 Negative  NEG^Negative mg/dL Final     Bilirubin Urine 01/15/2021 Negative  NEG^Negative Final     Ketones Urine 01/15/2021 Negative  NEG^Negative mg/dL Final     Specific Gravity Urine 01/15/2021 1.015  1.003 - 1.035 Final     Blood Urine 01/15/2021 Negative  NEG^Negative Final     pH Urine 01/15/2021 6.5  5.0 - 7.0 pH Final     Protein Albumin Urine 01/15/2021 Negative  NEG^Negative mg/dL Final     Urobilinogen Urine 01/15/2021 0.2  0.2 - 1.0 EU/dL Final     Nitrite Urine 01/15/2021 Negative  NEG^Negative Final     Leukocyte Esterase Urine 01/15/2021 Negative  NEG^Negative Final     Source 01/15/2021 Midstream Urine   Final     HPV Source 01/15/2021 SurePath   Final     HPV 16 DNA 01/15/2021 Negative  NEG^Negative Final     HPV 18 DNA 01/15/2021 Negative  NEG^Negative Final     Other HR HPV 01/15/2021 Negative  NEG^Negative Final     Final Diagnosis 01/15/2021 This patient's sample is negative for HPV DNA.   Final    Comment: This test was developed and its performance characteristics determined by the   [facility], [facility]. It   has not been cleared or approved by the FDA. The laboratory is regulated under   CLIA as qualified to perform high-complexity testing. This test is used for   clinical purposes. It should not be regarded as investigational or for    research.  (Note)  METHODOLOGY:  The Roche santhosh 4800 system uses automated extraction,   simultaneous amplification of HPV (L1 region) and beta-globin,    followed by  real time detection of fluorescent labeled HPV and beta   globin using specific oligonucleotide probes . The test specifically   identifies types HPV 16 DNA and HPV 18 DNA while concurrently   detecting the rest of the high risk types (31, 33, 35, 39, 45, 51,   52, 56, 58, 59, 66 or 68).  COMMENTS:  This test is not intended for use as a screening device   for women under age 30 with normal cervical cytology.  Results should   be correl                           ated with cytologic and histologic findings. Close clinical   followup is recommended.       Specimen Description 01/15/2021 Cervical Cells   Final     Copath Report 01/15/2021    Final                    Value:  Patient Name: MARIA LUISA VELASQUEZ  MR#: [id number]  Specimen #: [id number]  Collected: 1/15/2021  Received: 1/19/2021  Reported: 1/20/2021 15:19  Ordering Phy(s): ELIZABETH CLEARY    For improved result formatting, select 'View Enhanced Report Format' under   Linked Documents section.    SPECIMEN/STAIN PROCESS:  Pap imaged thin layer prep screening (Surepath, FocalPoint with guided   screening)       Pap-Cyto x 1, HPV ordered x 1    SOURCE: Cervical  ----------------------------------------------------------------   Pap imaged thin layer prep screening (Surepath, FocalPoint with guided   screening)  SPECIMEN ADEQUACY:  Satisfactory for evaluation.  -Transformation zone component present.    CYTOLOGIC INTERPRETATION:    Negative for intraepithelial lesion or malignancy    Electronically signed out by:  Flaco PATEL, (ASCP)    CLINICAL HISTORY:    Papanicolaou Test Limitations:  Cervical cytology is a screening test with   limited sensitivity; regular  sc                          reening is critical for cancer prevention; Pap tests are primarily    effective for the diagnosis/prevention of  squamous cell carcinoma, not adenocarcinomas or other cancers.    COLLECTION SITE:  Client:  [facility]  Location: [place] ([place])    The technical component of this testing was completed at the [facility], with the professional component performed   at the [facility], [address] [place] [phone] ([phone])

## 2021-03-08 DIAGNOSIS — A04.8 H. PYLORI INFECTION: ICD-10-CM

## 2021-03-08 DIAGNOSIS — K21.00 GASTROESOPHAGEAL REFLUX DISEASE WITH ESOPHAGITIS, UNSPECIFIED WHETHER HEMORRHAGE: ICD-10-CM

## 2021-03-08 PROCEDURE — 87338 HPYLORI STOOL AG IA: CPT | Performed by: INTERNAL MEDICINE

## 2021-03-09 LAB — H PYLORI AG STL QL IA: NEGATIVE

## 2021-03-16 ENCOUNTER — TRANSFERRED RECORDS (OUTPATIENT)
Dept: HEALTH INFORMATION MANAGEMENT | Facility: CLINIC | Age: 36
End: 2021-03-16

## 2021-03-16 LAB
ALT SERPL-CCNC: 86 U/L (ref 0–78)
AST SERPL-CCNC: 28 U/L (ref 0–37)

## 2021-03-19 ENCOUNTER — TRANSFERRED RECORDS (OUTPATIENT)
Dept: HEALTH INFORMATION MANAGEMENT | Facility: CLINIC | Age: 36
End: 2021-03-19

## 2021-03-19 DIAGNOSIS — R10.84 ABDOMINAL PAIN, GENERALIZED: ICD-10-CM

## 2021-03-19 DIAGNOSIS — Z00.00 ROUTINE GENERAL MEDICAL EXAMINATION AT A HEALTH CARE FACILITY: ICD-10-CM

## 2021-03-19 LAB
ERYTHROCYTE [DISTWIDTH] IN BLOOD BY AUTOMATED COUNT: 12.4 % (ref 10–15)
HBA1C MFR BLD: 5.4 % (ref 0–5.6)
HCT VFR BLD AUTO: 36.3 % (ref 35–47)
HGB BLD-MCNC: 11.7 G/DL (ref 11.7–15.7)
MCH RBC QN AUTO: 30.5 PG (ref 26.5–33)
MCHC RBC AUTO-ENTMCNC: 32.2 G/DL (ref 31.5–36.5)
MCV RBC AUTO: 95 FL (ref 78–100)
PLATELET # BLD AUTO: 304 10E9/L (ref 150–450)
RBC # BLD AUTO: 3.84 10E12/L (ref 3.8–5.2)
WBC # BLD AUTO: 6 10E9/L (ref 4–11)

## 2021-03-19 PROCEDURE — 36415 COLL VENOUS BLD VENIPUNCTURE: CPT | Performed by: INTERNAL MEDICINE

## 2021-03-19 PROCEDURE — 84443 ASSAY THYROID STIM HORMONE: CPT | Performed by: INTERNAL MEDICINE

## 2021-03-19 PROCEDURE — 83036 HEMOGLOBIN GLYCOSYLATED A1C: CPT | Performed by: INTERNAL MEDICINE

## 2021-03-19 PROCEDURE — 80053 COMPREHEN METABOLIC PANEL: CPT | Performed by: INTERNAL MEDICINE

## 2021-03-19 PROCEDURE — 80061 LIPID PANEL: CPT | Performed by: INTERNAL MEDICINE

## 2021-03-19 PROCEDURE — 85027 COMPLETE CBC AUTOMATED: CPT | Performed by: INTERNAL MEDICINE

## 2021-03-20 LAB
ALBUMIN SERPL-MCNC: 3.8 G/DL (ref 3.4–5)
ALP SERPL-CCNC: 68 U/L (ref 40–150)
ALT SERPL W P-5'-P-CCNC: 50 U/L (ref 0–50)
ANION GAP SERPL CALCULATED.3IONS-SCNC: 2 MMOL/L (ref 3–14)
AST SERPL W P-5'-P-CCNC: 21 U/L (ref 0–45)
BILIRUB SERPL-MCNC: 0.3 MG/DL (ref 0.2–1.3)
BUN SERPL-MCNC: 9 MG/DL (ref 7–30)
CALCIUM SERPL-MCNC: 8.7 MG/DL (ref 8.5–10.1)
CHLORIDE SERPL-SCNC: 107 MMOL/L (ref 94–109)
CHOLEST SERPL-MCNC: 139 MG/DL
CO2 SERPL-SCNC: 28 MMOL/L (ref 20–32)
CREAT SERPL-MCNC: 0.69 MG/DL (ref 0.52–1.04)
GFR SERPL CREATININE-BSD FRML MDRD: >90 ML/MIN/{1.73_M2}
GLUCOSE SERPL-MCNC: 82 MG/DL (ref 70–99)
HDLC SERPL-MCNC: 37 MG/DL
LDLC SERPL CALC-MCNC: 84 MG/DL
NONHDLC SERPL-MCNC: 102 MG/DL
POTASSIUM SERPL-SCNC: 3.7 MMOL/L (ref 3.4–5.3)
PROT SERPL-MCNC: 7.3 G/DL (ref 6.8–8.8)
SODIUM SERPL-SCNC: 137 MMOL/L (ref 133–144)
TRIGL SERPL-MCNC: 89 MG/DL
TSH SERPL DL<=0.005 MIU/L-ACNC: 1.08 MU/L (ref 0.4–4)

## 2021-03-25 ENCOUNTER — HOSPITAL ENCOUNTER (OUTPATIENT)
Dept: CT IMAGING | Facility: CLINIC | Age: 36
Discharge: HOME | End: 2021-03-25
Attending: INTERNAL MEDICINE | Admitting: INTERNAL MEDICINE
Payer: COMMERCIAL

## 2021-03-25 DIAGNOSIS — R09.A2 GLOBUS SENSATION: ICD-10-CM

## 2021-03-25 DIAGNOSIS — R12 HEARTBURN: ICD-10-CM

## 2021-03-25 DIAGNOSIS — R10.13 EPIGASTRIC PAIN: ICD-10-CM

## 2021-03-25 PROCEDURE — 74176 CT ABD & PELVIS W/O CONTRAST: CPT

## 2021-03-25 RX ORDER — IOPAMIDOL 755 MG/ML
500 INJECTION, SOLUTION INTRAVASCULAR ONCE
Status: DISCONTINUED | OUTPATIENT
Start: 2021-03-25 | End: 2021-03-25

## 2021-04-02 ENCOUNTER — TRANSFERRED RECORDS (OUTPATIENT)
Dept: HEALTH INFORMATION MANAGEMENT | Facility: CLINIC | Age: 36
End: 2021-04-02

## 2021-04-16 ENCOUNTER — TRANSFERRED RECORDS (OUTPATIENT)
Dept: HEALTH INFORMATION MANAGEMENT | Facility: CLINIC | Age: 36
End: 2021-04-16

## 2021-04-16 LAB
ALT SERPL-CCNC: 92 U/L (ref 0–78)
AST SERPL-CCNC: 43 U/L (ref 0–37)

## 2021-04-23 ENCOUNTER — HOSPITAL ENCOUNTER (OUTPATIENT)
Dept: ULTRASOUND IMAGING | Facility: CLINIC | Age: 36
Discharge: HOME | End: 2021-04-23
Attending: INTERNAL MEDICINE | Admitting: INTERNAL MEDICINE
Payer: COMMERCIAL

## 2021-04-23 ENCOUNTER — TRANSFERRED RECORDS (OUTPATIENT)
Dept: HEALTH INFORMATION MANAGEMENT | Facility: CLINIC | Age: 36
End: 2021-04-23

## 2021-04-23 DIAGNOSIS — R79.89 ABNORMAL LFTS: ICD-10-CM

## 2021-04-23 LAB — HEP C HIM: NORMAL

## 2021-04-23 PROCEDURE — 76705 ECHO EXAM OF ABDOMEN: CPT

## 2021-04-27 ENCOUNTER — TRANSFERRED RECORDS (OUTPATIENT)
Dept: HEALTH INFORMATION MANAGEMENT | Facility: CLINIC | Age: 36
End: 2021-04-27

## 2021-04-29 ENCOUNTER — HOSPITAL ENCOUNTER (OUTPATIENT)
Dept: NUCLEAR MEDICINE | Facility: CLINIC | Age: 36
Setting detail: NUCLEAR MEDICINE
Discharge: HOME | End: 2021-04-29
Attending: INTERNAL MEDICINE | Admitting: INTERNAL MEDICINE
Payer: COMMERCIAL

## 2021-04-29 DIAGNOSIS — R14.0 BLOATING: ICD-10-CM

## 2021-04-29 DIAGNOSIS — R10.13 EPIGASTRIC PAIN: ICD-10-CM

## 2021-04-29 DIAGNOSIS — G89.29 CHRONIC RLQ PAIN: ICD-10-CM

## 2021-04-29 DIAGNOSIS — R11.0 NAUSEA: ICD-10-CM

## 2021-04-29 DIAGNOSIS — R10.31 CHRONIC RLQ PAIN: ICD-10-CM

## 2021-04-29 DIAGNOSIS — R10.11 RUQ PAIN: ICD-10-CM

## 2021-04-29 DIAGNOSIS — R79.89 ABNORMAL LFTS: ICD-10-CM

## 2021-04-29 PROCEDURE — A9537 TC99M MEBROFENIN: HCPCS | Performed by: INTERNAL MEDICINE

## 2021-04-29 PROCEDURE — 78227 HEPATOBIL SYST IMAGE W/DRUG: CPT

## 2021-04-29 PROCEDURE — 343N000001 HC RX 343: Performed by: INTERNAL MEDICINE

## 2021-04-29 RX ORDER — KIT FOR THE PREPARATION OF TECHNETIUM TC 99M MEBROFENIN 45 MG/10ML
6 INJECTION, POWDER, LYOPHILIZED, FOR SOLUTION INTRAVENOUS ONCE
Status: COMPLETED | OUTPATIENT
Start: 2021-04-29 | End: 2021-04-29

## 2021-04-29 RX ADMIN — MEBROFENIN 6 MCI.: 45 INJECTION, POWDER, LYOPHILIZED, FOR SOLUTION INTRAVENOUS at 13:31

## 2021-05-03 ENCOUNTER — TRANSFERRED RECORDS (OUTPATIENT)
Dept: HEALTH INFORMATION MANAGEMENT | Facility: CLINIC | Age: 36
End: 2021-05-03

## 2021-05-04 ENCOUNTER — TRANSFERRED RECORDS (OUTPATIENT)
Dept: HEALTH INFORMATION MANAGEMENT | Facility: CLINIC | Age: 36
End: 2021-05-04

## 2021-06-02 ENCOUNTER — TELEPHONE (OUTPATIENT)
Dept: INTERNAL MEDICINE | Facility: CLINIC | Age: 36
End: 2021-06-02

## 2021-06-02 NOTE — TELEPHONE ENCOUNTER
Patient is calling to schedule an appointment for her throat.  Daughter had to get on the phone as RN was having a difficult time understanding her symptoms.  Patient does not have pain, she has developed tonsil stones.  Patient will gargle with salt water a few times per day and if no improvement or worsening symptoms, she will call back for an appointment.    Now patient would like to schedule an appointment for her liver and GI issues with soonest possible provider.  When asked if she is having symptoms that would require an immediate appointment, daughter states, no, she just wants to see a provider to discuss her medications and to be sure she is on the correct ones.    Daughter is transferred back to scheduling to get her an appointment within the next week to review medications.  Closing this encounter.  Cindi Cordero, ANTHONYN, RN

## 2021-06-09 ENCOUNTER — OFFICE VISIT (OUTPATIENT)
Dept: INTERNAL MEDICINE | Facility: CLINIC | Age: 36
End: 2021-06-09
Payer: COMMERCIAL

## 2021-06-09 VITALS
DIASTOLIC BLOOD PRESSURE: 56 MMHG | RESPIRATION RATE: 22 BRPM | TEMPERATURE: 98.9 F | HEART RATE: 70 BPM | WEIGHT: 169 LBS | OXYGEN SATURATION: 99 % | SYSTOLIC BLOOD PRESSURE: 90 MMHG | HEIGHT: 62 IN | BODY MASS INDEX: 31.1 KG/M2

## 2021-06-09 DIAGNOSIS — J35.8 TONSIL STONE: ICD-10-CM

## 2021-06-09 DIAGNOSIS — J02.9 THROAT SORENESS: ICD-10-CM

## 2021-06-09 DIAGNOSIS — I83.892 VARICOSE VEINS OF LEG WITH SWELLING, LEFT: Primary | ICD-10-CM

## 2021-06-09 DIAGNOSIS — I83.812 VARICOSE VEINS OF LEFT LOWER EXTREMITY WITH PAIN: ICD-10-CM

## 2021-06-09 DIAGNOSIS — L98.9 SKIN LESION: ICD-10-CM

## 2021-06-09 PROCEDURE — 99214 OFFICE O/P EST MOD 30 MIN: CPT | Performed by: NURSE PRACTITIONER

## 2021-06-09 RX ORDER — OMEPRAZOLE 20 MG/1
40 TABLET, DELAYED RELEASE ORAL 2 TIMES DAILY
COMMUNITY

## 2021-06-09 ASSESSMENT — MIFFLIN-ST. JEOR: SCORE: 1404.83

## 2021-06-09 NOTE — NURSING NOTE
"Chief Complaint   Patient presents with     Pain     left leg vein larger and causes pain intermittently when she has her menses     initial BP 90/56   Pulse 70   Temp 98.9  F (37.2  C) (Oral)   Resp 22   Ht 1.575 m (5' 2\")   Wt 76.7 kg (169 lb)   LMP 05/20/2021 (Approximate)   SpO2 99%   Breastfeeding No   BMI 30.91 kg/m   Estimated body mass index is 30.91 kg/m  as calculated from the following:    Height as of this encounter: 1.575 m (5' 2\").    Weight as of this encounter: 76.7 kg (169 lb)..  bp completed using cuff size regular  SRINI HUBBARD LPN  "

## 2021-06-09 NOTE — PATIENT INSTRUCTIONS
Vascular referral   They should call you to set up appointment     Dermatology   They should call you to set up appointment      FHN: [facility] [phone]   [email or web address]

## 2021-06-09 NOTE — PROGRESS NOTES
Assessment & Plan     Varicose veins of leg with swelling, left    - VASCULAR SURGERY REFERRAL; Future    Varicose veins of left lower extremity with pain    - VASCULAR SURGERY REFERRAL; Future    Skin lesion    - ADULT DERMATOLOGY REFERRAL; Future    Tonsil stone    - OTOLARYNGOLOGY REFERRAL    Throat soreness    - OTOLARYNGOLOGY REFERRAL      26 minutes spent on the date of the encounter doing chart review, history and exam, documentation and further activities per the note       Patient Instructions   Vascular referral   They should call you to set up appointment     Dermatology   They should call you to set up appointment      FHN: [facility] [phone]   [email or web address]        Return in about 1 year (around 6/9/2022).    ISIDRO [facility]    Deepika Soto is a 36 year old who presents for the following health issues     HPI   Chief Complaint   Patient presents with     Pain     left leg vein larger and causes pain intermittently when she has her menses  Painful and hot in left leg along varicose veins   Left leg is bigger than right   It has been this way for 2 years - swelling   Pain has been for about 3 months   Pain was to point she could not handle but now a little less   Ice helped and tylenol     Fat liver and nodule in gallbladder and 50 % functioning of liver - HIDA scan 50% EF which is normal   I do nto see liver fat noted   On 23 rd has appointment with doctor   Had gastroenterology appointment and sent to see liver specialist   Elevated liver function test    AST AND ALT were elevated         Review of Systems   Constitutional, HEENT, cardiovascular, pulmonary, GI, , musculoskeletal, neuro, skin, endocrine and psych systems are negative, except as otherwise noted.      Objective    BP 90/56   Pulse 70   Temp 98.9  F (37.2  C) (Oral)   Resp 22   Ht  "1.575 m (5' 2\")   Wt 76.7 kg (169 lb)   LMP 05/20/2021 (Approximate)   SpO2 99%   Breastfeeding No   BMI 30.91 kg/m    Body mass index is 30.91 kg/m .  Physical Exam   GENERAL: alert and no distress  HENT: mouth with white tonsil stone   RESP: lungs clear to auscultation - no rales, rhonchi or wheezes  CV: regular rate and rhythm  ABDOMEN: soft, nontender,  and bowel sounds normal  MS: no gross musculoskeletal defects noted, no edema  MS: left leg swelling and varicose vein distended and painful   SKIN: cherry angioma right leg   PSYCH: mentation appears normal, affect normal/bright                "

## 2021-06-10 ENCOUNTER — APPOINTMENT (OUTPATIENT)
Dept: INTERPRETER SERVICES | Facility: CLINIC | Age: 36
End: 2021-06-10
Payer: COMMERCIAL

## 2021-06-21 ENCOUNTER — TRANSFERRED RECORDS (OUTPATIENT)
Dept: HEALTH INFORMATION MANAGEMENT | Facility: CLINIC | Age: 36
End: 2021-06-21

## 2021-06-23 ENCOUNTER — TRANSFERRED RECORDS (OUTPATIENT)
Dept: HEALTH INFORMATION MANAGEMENT | Facility: CLINIC | Age: 36
End: 2021-06-23

## 2021-07-06 ENCOUNTER — OFFICE VISIT (OUTPATIENT)
Dept: VASCULAR SURGERY | Facility: CLINIC | Age: 36
End: 2021-07-06
Attending: NURSE PRACTITIONER
Payer: COMMERCIAL

## 2021-07-06 DIAGNOSIS — I83.892 VARICOSE VEINS OF LEG WITH SWELLING, LEFT: ICD-10-CM

## 2021-07-06 DIAGNOSIS — I83.812 VARICOSE VEINS OF LEFT LOWER EXTREMITY WITH PAIN: ICD-10-CM

## 2021-07-06 PROCEDURE — 99203 OFFICE O/P NEW LOW 30 MIN: CPT | Performed by: SURGERY

## 2021-07-06 NOTE — PROGRESS NOTES
VEIN SOLUTIONS CONSULT    Impression:  Left leg varicose veins with pain and swelling.    Plan:  With the help of a  I had a nice discussion with Brittany and her daughter reviewing basic venous pathophysiology.  She is given a prescription for knee-high and thigh-high compression stockings of 20-30 mmHg pressure.  She was instructed in their proper usage and she will begin wearing them on a daily basis for 3 months of conservative therapy.  I will arrange for a left leg venous competency study through this office.  Ultimate treatment recommendations will be pending the outcome of her left leg venous ultrasound and her response to conservative therapy.    All of her questions were answered and she verbalizes full understanding to the above and agreement with this management plan.      HPI:   Brittany Clancy is a healthy 36-year-old female who presents at this time for evaluation of symptomatic left leg varicosities.  She has noticed worsening left leg symptoms over the past year.  She complains of pain, aching, and left calf swelling by the end of the day.  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  She has no known familial history of varicose vein disease.  She has never utilized compression stockings.  She has no personal history of venous intervention.  Her symptoms do interfere with her activities of daily living.  Her own past medical history is otherwise noncontributory.      CURRENT MEDICATIONS  omeprazole (PRILOSEC OTC) 20 MG EC tablet, Take 40 mg by mouth 2 times daily    No current facility-administered medications on file prior to visit.         PAST MEDICAL HISTORY  Past Medical History:   Diagnosis Date     Depression with anxiety      Gastritis      Hip pain     coccyx about 4 months after pregnant          PAST SURGICAL HISTORY:  Past Surgical History:   Procedure Laterality Date      SECTION N/A 2016    Procedure:  SECTION;  Surgeon:  Rajwinder Aly MD;  Location: [facility] OR       ALLERGIES   No Known Allergies    FAMILY HISTORY  Family History   Problem Relation Age of Onset     Diabetes Father      Heart Disease Father 51        MI      Diabetes Paternal Grandmother      Hypertension Paternal Grandmother      Lipids Mother      Gastrointestinal Disease Mother         Gastric ulcer     No Known Problems Sister      No Known Problems Brother      No Known Problems Daughter      Cancer No family hx of        SOCIAL HISTORY  Social History     Tobacco Use     Smoking status: Never Smoker     Smokeless tobacco: Never Used   Substance Use Topics     Alcohol use: No     Drug use: No       ROS:   Review of Systems   HENT: Negative.    Cardiovascular: Positive for leg swelling.   Respiratory: Negative.    Hematologic/Lymphatic: Negative.    Skin: Negative.    Musculoskeletal: Positive for joint pain.   Gastrointestinal: Negative.    Genitourinary: Negative.    Neurological: Negative.    Psychiatric/Behavioral: Negative.    Allergic/Immunologic: Negative.          EXAM:  There were no vitals taken for this visit.  Physical Exam  Constitutional:       Appearance: Normal appearance.   Eyes:      General: No scleral icterus.     Extraocular Movements: Extraocular movements intact.      Pupils: Pupils are equal, round, and reactive to light.   Cardiovascular:      Pulses: Normal pulses.      Comments: Moderate varicosities on the left medial thigh and calf.  Trace left ankle edema.  Musculoskeletal:         General: Normal range of motion.      Left lower leg: Edema present.   Skin:     General: Skin is warm and dry.   Neurological:      General: No focal deficit present.      Mental Status: She is alert and oriented to person, place, and time. Mental status is at baseline.   Psychiatric:         Mood and Affect: Mood normal.         Behavior: Behavior normal.         Thought Content: Thought content normal.         Judgment: Judgment normal.        Labs:  LIPID RESULTS:  Lab Results   Component Value Date    CHOL 139 03/19/2021    HDL 37 (L) 03/19/2021    LDL 84 03/19/2021    TRIG 89 03/19/2021    CHOLHDLRATIO 4.1 07/31/2014       CBC RESULTS:  Lab Results   Component Value Date    WBC 6.0 03/19/2021    RBC 3.84 03/19/2021    HGB 11.7 03/19/2021    HCT 36.3 03/19/2021    MCV 95 03/19/2021    MCH 30.5 03/19/2021    MCHC 32.2 03/19/2021    RDW 12.4 03/19/2021     03/19/2021       BMP RESULTS:  Lab Results   Component Value Date     03/19/2021    POTASSIUM 3.7 03/19/2021    CHLORIDE 107 03/19/2021    CO2 28 03/19/2021    ANIONGAP 2 (L) 03/19/2021    GLC 82 03/19/2021    BUN 9 03/19/2021    CR 0.69 03/19/2021    GFRESTIMATED >90 03/19/2021    GFRESTBLACK >90 03/19/2021    TAWANNA 8.7 03/19/2021        A1C RESULTS:  Lab Results   Component Value Date    A1C 5.4 03/19/2021         Imaging:  No results found for this or any previous visit (from the past 24 hour(s)).        Total length of this encounter was 20 minutes.      Teodoro Jasso MD

## 2021-07-06 NOTE — LETTER
7/6/2021         RE: Brittany Clancy  [address]  [phone]  [place] [address]        Dear Colleague,    Thank you for referring your patient, Brittany Clancy, to the [facility] [place]. Please see a copy of my visit note below.    VEIN SOLUTIONS CONSULT    Impression:  Left leg varicose veins with pain and swelling.    Plan:  With the help of a  I had a nice discussion with Brittany and her daughter reviewing basic venous pathophysiology.  She is given a prescription for knee-high and thigh-high compression stockings of 20-30 mmHg pressure.  She was instructed in their proper usage and she will begin wearing them on a daily basis for 3 months of conservative therapy.  I will arrange for a left leg venous competency study through this office.  Ultimate treatment recommendations will be pending the outcome of her left leg venous ultrasound and her response to conservative therapy.    All of her questions were answered and she verbalizes full understanding to the above and agreement with this management plan.      HPI:   Brittany Clancy is a healthy 36-year-old female who presents at this time for evaluation of symptomatic left leg varicosities.  She has noticed worsening left leg symptoms over the past year.  She complains of pain, aching, and left calf swelling by the end of the day.  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  She has no known familial history of varicose vein disease.  She has never utilized compression stockings.  She has no personal history of venous intervention.  Her symptoms do interfere with her activities of daily living.  Her own past medical history is otherwise noncontributory.      CURRENT MEDICATIONS  omeprazole (PRILOSEC OTC) 20 MG EC tablet, Take 40 mg by mouth 2 times daily    No current facility-administered medications on file prior to visit.         PAST MEDICAL HISTORY  Past Medical History:    Diagnosis Date     Depression with anxiety      Gastritis      Hip pain     coccyx about 4 months after pregnant          PAST SURGICAL HISTORY:  Past Surgical History:   Procedure Laterality Date      SECTION N/A 2016    Procedure:  SECTION;  Surgeon: Rajwinder Aly MD;  Location: [facility] OR       ALLERGIES   No Known Allergies    FAMILY HISTORY  Family History   Problem Relation Age of Onset     Diabetes Father      Heart Disease Father 51        MI      Diabetes Paternal Grandmother      Hypertension Paternal Grandmother      Lipids Mother      Gastrointestinal Disease Mother         Gastric ulcer     No Known Problems Sister      No Known Problems Brother      No Known Problems Daughter      Cancer No family hx of        SOCIAL HISTORY  Social History     Tobacco Use     Smoking status: Never Smoker     Smokeless tobacco: Never Used   Substance Use Topics     Alcohol use: No     Drug use: No       ROS:   Review of Systems   HENT: Negative.    Cardiovascular: Positive for leg swelling.   Respiratory: Negative.    Hematologic/Lymphatic: Negative.    Skin: Negative.    Musculoskeletal: Positive for joint pain.   Gastrointestinal: Negative.    Genitourinary: Negative.    Neurological: Negative.    Psychiatric/Behavioral: Negative.    Allergic/Immunologic: Negative.          EXAM:  There were no vitals taken for this visit.  Physical Exam  Constitutional:       Appearance: Normal appearance.   Eyes:      General: No scleral icterus.     Extraocular Movements: Extraocular movements intact.      Pupils: Pupils are equal, round, and reactive to light.   Cardiovascular:      Pulses: Normal pulses.      Comments: Moderate varicosities on the left medial thigh and calf.  Trace left ankle edema.  Musculoskeletal:         General: Normal range of motion.      Left lower leg: Edema present.   Skin:     General: Skin is warm and dry.   Neurological:      General: No focal deficit present.       Mental Status: She is alert and oriented to person, place, and time. Mental status is at baseline.   Psychiatric:         Mood and Affect: Mood normal.         Behavior: Behavior normal.         Thought Content: Thought content normal.         Judgment: Judgment normal.       Labs:  LIPID RESULTS:  Lab Results   Component Value Date    CHOL 139 03/19/2021    HDL 37 (L) 03/19/2021    LDL 84 03/19/2021    TRIG 89 03/19/2021    CHOLHDLRATIO 4.1 07/31/2014       CBC RESULTS:  Lab Results   Component Value Date    WBC 6.0 03/19/2021    RBC 3.84 03/19/2021    HGB 11.7 03/19/2021    HCT 36.3 03/19/2021    MCV 95 03/19/2021    MCH 30.5 03/19/2021    MCHC 32.2 03/19/2021    RDW 12.4 03/19/2021     03/19/2021       BMP RESULTS:  Lab Results   Component Value Date     03/19/2021    POTASSIUM 3.7 03/19/2021    CHLORIDE 107 03/19/2021    CO2 28 03/19/2021    ANIONGAP 2 (L) 03/19/2021    GLC 82 03/19/2021    BUN 9 03/19/2021    CR 0.69 03/19/2021    GFRESTIMATED >90 03/19/2021    GFRESTBLACK >90 03/19/2021    TAWANNA 8.7 03/19/2021        A1C RESULTS:  Lab Results   Component Value Date    A1C 5.4 03/19/2021         Imaging:  No results found for this or any previous visit (from the past 24 hour(s)).        Total length of this encounter was 20 minutes.      Teodoro Jasso MD          Again, thank you for allowing me to participate in the care of your patient.        Sincerely,        Teodoro Jasso MD

## 2021-07-11 ASSESSMENT — ENCOUNTER SYMPTOMS
NEUROLOGICAL NEGATIVE: 1
RESPIRATORY NEGATIVE: 1
ALLERGIC/IMMUNOLOGIC NEGATIVE: 1
GASTROINTESTINAL NEGATIVE: 1
HEMATOLOGIC/LYMPHATIC NEGATIVE: 1
PSYCHIATRIC NEGATIVE: 1

## 2021-11-29 ENCOUNTER — TRANSFERRED RECORDS (OUTPATIENT)
Dept: HEALTH INFORMATION MANAGEMENT | Facility: CLINIC | Age: 36
End: 2021-11-29
Payer: COMMERCIAL

## 2021-12-28 PROCEDURE — 99285 EMERGENCY DEPT VISIT HI MDM: CPT | Mod: 25

## 2021-12-28 PROCEDURE — 36415 COLL VENOUS BLD VENIPUNCTURE: CPT | Performed by: EMERGENCY MEDICINE

## 2021-12-28 PROCEDURE — 85379 FIBRIN DEGRADATION QUANT: CPT | Performed by: EMERGENCY MEDICINE

## 2021-12-28 PROCEDURE — 84703 CHORIONIC GONADOTROPIN ASSAY: CPT | Performed by: EMERGENCY MEDICINE

## 2021-12-28 PROCEDURE — 82310 ASSAY OF CALCIUM: CPT | Performed by: EMERGENCY MEDICINE

## 2021-12-28 PROCEDURE — 93005 ELECTROCARDIOGRAM TRACING: CPT

## 2021-12-28 PROCEDURE — 96361 HYDRATE IV INFUSION ADD-ON: CPT

## 2021-12-28 PROCEDURE — 96374 THER/PROPH/DIAG INJ IV PUSH: CPT | Mod: 59

## 2021-12-28 PROCEDURE — 84484 ASSAY OF TROPONIN QUANT: CPT | Performed by: EMERGENCY MEDICINE

## 2021-12-28 PROCEDURE — 85025 COMPLETE CBC W/AUTO DIFF WBC: CPT | Performed by: EMERGENCY MEDICINE

## 2021-12-28 RX ORDER — ASPIRIN 81 MG/1
324 TABLET, CHEWABLE ORAL ONCE
Status: DISCONTINUED | OUTPATIENT
Start: 2021-12-28 | End: 2021-12-29

## 2021-12-28 ASSESSMENT — MIFFLIN-ST. JEOR: SCORE: 1412.25

## 2021-12-29 ENCOUNTER — HOSPITAL ENCOUNTER (EMERGENCY)
Facility: CLINIC | Age: 36
Discharge: HOME | End: 2021-12-29
Attending: EMERGENCY MEDICINE | Admitting: EMERGENCY MEDICINE
Payer: COMMERCIAL

## 2021-12-29 ENCOUNTER — APPOINTMENT (OUTPATIENT)
Dept: CT IMAGING | Facility: CLINIC | Age: 36
End: 2021-12-29
Attending: EMERGENCY MEDICINE
Payer: COMMERCIAL

## 2021-12-29 ENCOUNTER — APPOINTMENT (OUTPATIENT)
Dept: ULTRASOUND IMAGING | Facility: CLINIC | Age: 36
End: 2021-12-29
Attending: EMERGENCY MEDICINE
Payer: COMMERCIAL

## 2021-12-29 VITALS
WEIGHT: 170.64 LBS | OXYGEN SATURATION: 100 % | DIASTOLIC BLOOD PRESSURE: 67 MMHG | RESPIRATION RATE: 18 BRPM | SYSTOLIC BLOOD PRESSURE: 114 MMHG | BODY MASS INDEX: 31.4 KG/M2 | TEMPERATURE: 98.7 F | HEART RATE: 100 BPM | HEIGHT: 62 IN

## 2021-12-29 DIAGNOSIS — R06.00 DYSPNEA, UNSPECIFIED TYPE: ICD-10-CM

## 2021-12-29 LAB
ANION GAP SERPL CALCULATED.3IONS-SCNC: 5 MMOL/L (ref 3–14)
ATRIAL RATE - MUSE: 97 BPM
BASOPHILS # BLD AUTO: 0 10E3/UL (ref 0–0.2)
BASOPHILS NFR BLD AUTO: 1 %
BUN SERPL-MCNC: 12 MG/DL (ref 7–30)
CALCIUM SERPL-MCNC: 8.5 MG/DL (ref 8.5–10.1)
CHLORIDE BLD-SCNC: 109 MMOL/L (ref 94–109)
CO2 SERPL-SCNC: 24 MMOL/L (ref 20–32)
CREAT SERPL-MCNC: 0.61 MG/DL (ref 0.52–1.04)
D DIMER PPP FEU-MCNC: 0.83 UG/ML FEU (ref 0–0.5)
DIASTOLIC BLOOD PRESSURE - MUSE: NORMAL MMHG
EOSINOPHIL # BLD AUTO: 0.2 10E3/UL (ref 0–0.7)
EOSINOPHIL NFR BLD AUTO: 3 %
ERYTHROCYTE [DISTWIDTH] IN BLOOD BY AUTOMATED COUNT: 12.6 % (ref 10–15)
GFR SERPL CREATININE-BSD FRML MDRD: >90 ML/MIN/1.73M2
GLUCOSE BLD-MCNC: 98 MG/DL (ref 70–99)
HCG SERPL QL: NEGATIVE
HCT VFR BLD AUTO: 36.6 % (ref 35–47)
HGB BLD-MCNC: 12.2 G/DL (ref 11.7–15.7)
HOLD SPECIMEN: NORMAL
IMM GRANULOCYTES # BLD: 0 10E3/UL
IMM GRANULOCYTES NFR BLD: 0 %
INTERPRETATION ECG - MUSE: NORMAL
LYMPHOCYTES # BLD AUTO: 1 10E3/UL (ref 0.8–5.3)
LYMPHOCYTES NFR BLD AUTO: 19 %
MCH RBC QN AUTO: 31.7 PG (ref 26.5–33)
MCHC RBC AUTO-ENTMCNC: 33.3 G/DL (ref 31.5–36.5)
MCV RBC AUTO: 95 FL (ref 78–100)
MONOCYTES # BLD AUTO: 0.5 10E3/UL (ref 0–1.3)
MONOCYTES NFR BLD AUTO: 10 %
NEUTROPHILS # BLD AUTO: 3.6 10E3/UL (ref 1.6–8.3)
NEUTROPHILS NFR BLD AUTO: 67 %
NRBC # BLD AUTO: 0 10E3/UL
NRBC BLD AUTO-RTO: 0 /100
P AXIS - MUSE: 57 DEGREES
PLATELET # BLD AUTO: 271 10E3/UL (ref 150–450)
POTASSIUM BLD-SCNC: 3.9 MMOL/L (ref 3.4–5.3)
PR INTERVAL - MUSE: 128 MS
QRS DURATION - MUSE: 80 MS
QT - MUSE: 364 MS
QTC - MUSE: 462 MS
R AXIS - MUSE: 43 DEGREES
RBC # BLD AUTO: 3.85 10E6/UL (ref 3.8–5.2)
SODIUM SERPL-SCNC: 138 MMOL/L (ref 133–144)
SYSTOLIC BLOOD PRESSURE - MUSE: NORMAL MMHG
T AXIS - MUSE: 45 DEGREES
TROPONIN I SERPL HS-MCNC: <3 NG/L
VENTRICULAR RATE- MUSE: 97 BPM
WBC # BLD AUTO: 5.3 10E3/UL (ref 4–11)

## 2021-12-29 PROCEDURE — 250N000011 HC RX IP 250 OP 636: Performed by: EMERGENCY MEDICINE

## 2021-12-29 PROCEDURE — 71275 CT ANGIOGRAPHY CHEST: CPT

## 2021-12-29 PROCEDURE — 93970 EXTREMITY STUDY: CPT

## 2021-12-29 PROCEDURE — 258N000003 HC RX IP 258 OP 636: Performed by: EMERGENCY MEDICINE

## 2021-12-29 RX ORDER — KETOROLAC TROMETHAMINE 15 MG/ML
15 INJECTION, SOLUTION INTRAMUSCULAR; INTRAVENOUS ONCE
Status: COMPLETED | OUTPATIENT
Start: 2021-12-29 | End: 2021-12-29

## 2021-12-29 RX ORDER — IOPAMIDOL 755 MG/ML
500 INJECTION, SOLUTION INTRAVASCULAR ONCE
Status: COMPLETED | OUTPATIENT
Start: 2021-12-29 | End: 2021-12-29

## 2021-12-29 RX ADMIN — KETOROLAC TROMETHAMINE 15 MG: 15 INJECTION, SOLUTION INTRAMUSCULAR; INTRAVENOUS at 05:43

## 2021-12-29 RX ADMIN — IOPAMIDOL 60 ML: 755 INJECTION, SOLUTION INTRAVENOUS at 05:07

## 2021-12-29 RX ADMIN — SODIUM CHLORIDE 81 ML: 9 INJECTION, SOLUTION INTRAVENOUS at 05:07

## 2021-12-29 ASSESSMENT — ENCOUNTER SYMPTOMS
CHILLS: 0
VOMITING: 0
NAUSEA: 0
FEVER: 0
DIARRHEA: 0
ABDOMINAL PAIN: 1
SHORTNESS OF BREATH: 1

## 2021-12-29 NOTE — Clinical Note
Brittany Clancy was seen and treated in our emergency department on 12/28/2021.  She may return to work on 12/30/2021.       If you have any questions or concerns, please don't hesitate to call.      Verenice Ibrahim, DO

## 2021-12-29 NOTE — Clinical Note
Raj Cabrera accompanied Brittany Clancy to the emergency department on 12/28/2021. They may return to work on 12/30/2021.      If you have any questions or concerns, please don't hesitate to call.      Verenice Ibrahim, DO

## 2021-12-29 NOTE — ED TRIAGE NOTES
Pt had vein surgery in dr office 12/23. Now having bilateral shoulder pain started on left and now on right pt has not been vaccinated and is sob also

## 2021-12-29 NOTE — ED PROVIDER NOTES
History   Chief Complaint:  Shortness of Breath and Post-op Problem     The history is provided by the patient. The history is limited by a language barrier. A  was used.      Brittany Clancy is a 36 year old female who presents with shortness of breath and a post-op problem. In the last month, the patient has had four vein procedures in her lower extremities for her varicose veins. The most recent procedure was six days ago. At 1630 yesterday, the patient developed constant left arm pain that began radiating into her right arm with accompanying shortness of breath. This pain is exacerbated by deep breaths. She spoke with her doctor regarding this pain and was advised to present here for blood clot screening. She notes she is experiencing some abdominal pain but attributes this to her menstrual cycle and denies any current pain. She denies any chest pain, current dyspnea, fever, chills, nausea, emesis, dysuria or diarrhea. Of note, she is not on birth control or any hormone treatments. She denies chance of pregnancy as tubal ligation.  She is not Covid vaccinated.     Review of Systems   Constitutional: Negative for chills and fever.   Respiratory: Positive for shortness of breath.    Cardiovascular: Negative for chest pain.   Gastrointestinal: Positive for abdominal pain. Negative for diarrhea, nausea and vomiting.   Musculoskeletal:        Bilateral arm pain   All other systems reviewed and are negative.    Allergies:  The patient has no known allergies.     Medications:  Prilosec    Past Medical History:    Depression with anxiety  Gastritis  Obesity   Varicella  Varicosities    Past Surgical History:    C section  Tubal ligation  Bosque teeth extraction    Family History:    Father: Diabetes, MI  Mother: Lipids, Gastric ulcer    Social History:  The patient was accompanied to the ED by a .    Physical Exam     Patient Vitals for the past 24 hrs:   BP Temp Temp src Pulse  "Resp SpO2 Height Weight   12/29/21 0356 114/67 -- -- 100 18 100 % -- --   12/28/21 2309 130/66 98.7  F (37.1  C) Temporal 90 22 100 % 1.575 m (5' 2\") 77.4 kg (170 lb 10.2 oz)       Physical Exam  Nursing note and vitals reviewed.  Constitutional: Well nourished. Resting comfortably.   Eyes: Conjunctiva normal.  Pupils are equal, round, and reactive to light.   ENT: Nose normal. Mucous membranes pink and moist.    Neck: Normal range of motion.  CVS: Normal rate, regular rhythm.  Normal heart sounds.  No murmur. 2/2 radial pulses.   Pulmonary: Lungs clear to auscultation bilaterally. No wheezes/rales/rhonchi.  GI: Abdomen soft. Nontender, nondistended. No rigidity or guarding.    MSK: No calf tenderness or swelling.  No upper extremity bony tenderness bilaterally. Inner thighs with 1cm area of erythema covered with steristrip. No surrounding warmth/tenderness  Neuro: Alert. Follows simple commands. Sensation intact x 4  Skin: Skin is warm and dry. No rash noted.   Psychiatric: Normal affect.       Emergency Department Course   ECG:  ECG taken at 2319, ECG read at 2343  Normal sinus rhythm  Normal ECG  Rate 97 bpm. MI interval 128 ms. QRS duration 80 ms. QT/QTc 364/462 ms. P-R-T axes 57 43 45.    Imaging:  US Lower Extremity Venous Duplex Bilateral  1.  No deep venous thrombosis in the bilateral lower extremities.  Reading per radiology    CT Chest Pulmonary Embolism w Contrast  1.  No evidence for pulmonary embolism.  2.  Normal caliber aorta without dissection.  3.  No infiltrates or effusions.  Reading per radiology    Laboratory:  CBC: WBC 5.3, HGB 12.2,   BMP: AWNL (Creatinine 0.61)    Troponin (Collected 2354): <3    D Dimer (Collected 2354): 0.83 (H)    HCG qualitative: negative    Emergency Department Course:  Reviewed:  I reviewed nursing notes, vitals, past medical history and care everywhere    Assessments:  0432 I obtained history and examined the patient as noted above.     1226 I rechecked and " updated the patient regarding the imaging results, laboratory results and the plan for care.    Interventions:  0543 Toradol 15mg IV    Disposition:  The patient was discharged to home.     Impression & Plan   Medical Decision Making:  Patient is a 36-year-old female presenting with reported dyspnea and bilateral arm pain.  She is nontoxic on arrival, in no significant distress.  She has no reproducible pain on exam.  EKG without ischemic changes.  High-sensitivity troponin negative.  I have a lower suspicion for ACS given her symptoms greater than 6 hours. She denies any active chest pain. D-dimer elevated so she did undergo formal CT which is fortunately without evidence of PE, fluid overload, widened mediastinum or pneumothorax.  She did also undergo lower extremity duplex ultrasounds given her recent procedure though these are without evidence of DVT. Labs overall reassuring without evidence to suggest profound anemia, electrolyte derangements or underlying sepsis.  She is not pregnant.  She has no abdominal tenderness and I doubt intraabdominal source to explain her presentation.  I did recommend COVID 19 testing in light of her symptoms particularly as she is unvaccinated though she declined.  She expressed understanding of missed/delayed diagnoses.  Patient did report some symptom improvement after IV toradol.  I do not feel further emergent workup is warranted at this time though did recommend close outpatient f/u for further evaluation. Return for increasing pain or should symptoms worsen/change.     Diagnosis:    ICD-10-CM    1. Dyspnea, unspecified type  R06.00        Discharge Medications:  New Prescriptions    No medications on file       Scribe Disclosure:  Dale TARANGO, am serving as a scribe at 4:38 AM on 12/29/2021 to document services personally performed by Verenice Ibrahim DO based on my observations and the provider's statements to me.      Verenice Ibrahim DO  12/30/21 [id number]